# Patient Record
Sex: MALE | Race: OTHER | Employment: FULL TIME | URBAN - METROPOLITAN AREA
[De-identification: names, ages, dates, MRNs, and addresses within clinical notes are randomized per-mention and may not be internally consistent; named-entity substitution may affect disease eponyms.]

---

## 2020-03-10 ENCOUNTER — HOSPITAL ENCOUNTER (OUTPATIENT)
Dept: LAB | Age: 25
Discharge: HOME | End: 2020-03-10

## 2020-03-10 ENCOUNTER — OFFICE VISIT (OUTPATIENT)
Dept: FAMILY MEDICINE CLINIC | Age: 25
End: 2020-03-10

## 2020-03-10 VITALS
HEIGHT: 62 IN | WEIGHT: 151 LBS | BODY MASS INDEX: 27.79 KG/M2 | TEMPERATURE: 98.6 F | DIASTOLIC BLOOD PRESSURE: 81 MMHG | SYSTOLIC BLOOD PRESSURE: 136 MMHG | HEART RATE: 62 BPM

## 2020-03-10 DIAGNOSIS — R10.30 LOWER ABDOMINAL PAIN: ICD-10-CM

## 2020-03-10 DIAGNOSIS — Z13.9 ENCOUNTER FOR SCREENING: ICD-10-CM

## 2020-03-10 DIAGNOSIS — R10.30 LOWER ABDOMINAL PAIN: Primary | ICD-10-CM

## 2020-03-10 LAB
APPEARANCE UR: CLEAR
BACTERIA URNS QL MICRO: NEGATIVE /HPF
BILIRUB UR QL STRIP: NEGATIVE
BILIRUB UR QL: NEGATIVE
COLOR UR: NORMAL
EPITH CASTS URNS QL MICRO: NORMAL /LPF
GLUCOSE UR STRIP.AUTO-MCNC: NEGATIVE MG/DL
GLUCOSE UR-MCNC: NEGATIVE MG/DL
HGB UR QL STRIP: NEGATIVE
KETONES P FAST UR STRIP-MCNC: NEGATIVE MG/DL
KETONES UR QL STRIP.AUTO: NEGATIVE MG/DL
LEUKOCYTE ESTERASE UR QL STRIP.AUTO: NEGATIVE
NITRITE UR QL STRIP.AUTO: NEGATIVE
PH UR STRIP: 6.5 [PH] (ref 4.6–8)
PH UR STRIP: 6.5 [PH] (ref 5–8)
PROT UR QL STRIP: NEGATIVE
PROT UR STRIP-MCNC: NEGATIVE MG/DL
RBC #/AREA URNS HPF: NORMAL /HPF (ref 0–5)
SP GR UR REFRACTOMETRY: <1.005 (ref 1–1.03)
SP GR UR STRIP: 1 (ref 1–1.03)
UA UROBILINOGEN AMB POC: NORMAL (ref 0.2–1)
URINALYSIS CLARITY POC: CLEAR
URINALYSIS COLOR POC: YELLOW
URINE BLOOD POC: NORMAL
URINE LEUKOCYTES POC: NEGATIVE
URINE NITRITES POC: NEGATIVE
UROBILINOGEN UR QL STRIP.AUTO: 0.2 EU/DL (ref 0.2–1)
WBC URNS QL MICRO: NORMAL /HPF (ref 0–4)

## 2020-03-10 PROCEDURE — 81001 URINALYSIS AUTO W/SCOPE: CPT

## 2020-03-10 RX ORDER — IBUPROFEN 800 MG/1
800 TABLET ORAL
Qty: 60 TAB | Refills: 0 | Status: SHIPPED | OUTPATIENT
Start: 2020-03-10 | End: 2022-03-21

## 2020-03-10 NOTE — PROGRESS NOTES
Assessment/Plan:   Diagnoses and all orders for this visit:    1. Lower abdominal pain  -     URINALYSIS W/MICROSCOPIC; Future  -     ibuprofen (MOTRIN) 800 mg tablet; Take 1 Tab by mouth every eight (8) hours as needed for Pain. Belarusian sig    2. Encounter for screening  -     AMB POC URINALYSIS DIP STICK MANUAL W/O MICRO          [facility]  Subjective:   Claudine Triplett is a 26 y.o. male. Chief Complaint   Patient presents with    Urinary Pain     x2 month. No itching. \"Its a deep pain\"     History of Present Illness: deep urinary pain x 2 months. Constant? 20 days ago the pain started strong. No history of this. Pain of the urethra? No  Has soft stool, no constipation. 8/10 pain right now. The pain is worse with lying down. Last week he had back pain. He no longer has back pain. Pain in the abdomen? yes   Review of Systems: Negative for: fever, chest pain, shortness of breath, leg swelling. Social History: He  reports that he has never smoked. He has never used smokeless tobacco. He reports that he does not drink alcohol or use drugs. Current Medications:   Current Outpatient Medications   Medication Sig    ibuprofen (MOTRIN) 800 mg tablet Take 1 Tab by mouth every eight (8) hours as needed for Pain. [place]ian sig     No current facility-administered medications for this visit.         Objective:     Visit Vitals  /81 (BP 1 Location: Right arm)   Pulse 62   Temp 98.6 °F (37 °C) (Temporal)   Ht 5' 1.93\" (1.573 m)   Wt 151 lb (68.5 kg)   BMI 27.68 kg/m²      Wt Readings from Last 2 Encounters:   03/10/20 151 lb (68.5 kg)      Lab Review:  Results for orders placed or performed in visit on 03/10/20   AMB POC URINALYSIS DIP STICK MANUAL W/O MICRO   Result Value Ref Range    Color (UA POC) Yellow     Clarity (UA POC) Clear     Glucose (UA POC) Negative Negative    Bilirubin (UA POC) Negative Negative    Ketones (UA POC) Negative Negative    Specific gravity (UA POC) 1.005 1.001 - 1.035    Blood (UA POC) Trace Negative    pH (UA POC) 6.5 4.6 - 8.0    Protein (UA POC) Negative Negative    Urobilinogen (UA POC) normal 0.2 - 1    Nitrites (UA POC) Negative Negative    Leukocyte esterase (UA POC) Negative Negative      Physical Examination:   General appearance - well developed, no acute distress. Chest - clear to auscultation. Heart - regular rate and rhythm without murmurs, rubs, or gallops. Abdomen - bowel sounds present x 4, mild tenderness lower abdomen; ND.   - no testicular masses, no hernias, uncircumsized penis, no lesions or discharge. No testicular torsion. Extremities - distal sensation intact, no CCE. Assessment/Plan:   Diagnoses and all orders for this visit:    1. Lower abdominal pain  -     URINALYSIS W/MICROSCOPIC; Future  -     ibuprofen (MOTRIN) 800 mg tablet; Take 1 Tab by mouth every eight (8) hours as needed for Pain. [place] sig    2. Encounter for screening  -     AMB POC URINALYSIS DIP STICK MANUAL W/O MICRO      Elver Merlos, MSN, RN, FNP-BC, BC-ADM  Carmina Samayoa expressed understanding of this plan.

## 2020-03-10 NOTE — PROGRESS NOTES
Results for orders placed or performed in visit on 03/10/20   AMB POC URINALYSIS DIP STICK MANUAL W/O MICRO   Result Value Ref Range    Color (UA POC) Yellow     Clarity (UA POC) Clear     Glucose (UA POC) Negative Negative    Bilirubin (UA POC) Negative Negative    Ketones (UA POC) Negative Negative    Specific gravity (UA POC) 1.005 1.001 - 1.035    Blood (UA POC) Trace Negative    pH (UA POC) 6.5 4.6 - 8.0    Protein (UA POC) Negative Negative    Urobilinogen (UA POC) normal 0.2 - 1    Nitrites (UA POC) Negative Negative    Leukocyte esterase (UA POC) Negative Negative

## 2020-03-10 NOTE — PATIENT INSTRUCTIONS
Aprenda sobre la dieta para prevenir los cálculos renales - [ Learning About Diet for Kidney Stone Prevention ]  ¿Qué son los cálculos renales? Los cálculos renales están compuestos por sales y [employer] en la orina que gabino pequeñas \"piedritas\". Los cálculos pueden formarse en los riñones y en los uréteres (los conductos que van de los riñones a la vejiga). También pueden formarse en la vejiga. Los cálculos podrían no ser un problema en tanto se queden en los riñones. Kasia pueden provocar un dolor repentino e intenso. Es muy probable que haya dolor si los cálculos se desplazan de los riñones a la vejiga. Los cálculos renales pueden provocar teddy en la orina. Los cálculos renales suelen ser hereditarios. Usted tiene más probabilidades de tenerlos si no atif suficientes líquidos, en especial agua. Determinados alimentos y bebidas y algunos suplementos dietéticos también pueden elevar villaseñor riesgo de tener cálculos renales si los consume en exceso. ¿Qué puede hacer para prevenir los cálculos renales? Cambiar lo que come podría no prevenir todos los tipos de cálculos renales. Kasia para las personas que tienen antecedentes de ciertos tipos de cálculos renales, algunos cambios en la dieta podrían ayudar. Un dietista puede ayudarle a elaborar un plan de comidas que incluya opciones saludables y con bajo contenido de oxalatos. Aquí hay algunas pautas generales para comenzar. Planifique manuela comidas y refrigerios centrándose en alimentos con bajo contenido de oxalatos. Estos alimentos incluyen:  · Maíz, col Larisa Fend y calabaza. · Carne de res, alex, cerdo, pavo y pescado. · carmen Vano y yogur. Puede comer determinados alimentos que samantha medianamente ricos en oxalatos, kasia solo de vez en cuando. Estos alimentos incluyen:  · Pan. · Arroz integral.  · Panecillos ingleses (\"English muffins\"). · Higos. · Palomitas de maíz. · Habichuelas (judías verdes). · Tomates.   Limite los alimentos con muy alto contenido en oxalatos, entre ellos:  · Té morgan. · Café. · Chocolate. · Verduras de hojas shelia oscuro. · [place] (vince secos). Estas son otras cosas que puede hacer para ayudar prevenir los cálculos renales:  · Randi mucho líquido. Si tiene keith enfermedad renal, cardíaca o hepática y tiene que restringir los líquidos, hable con villaseñor médico antes de aumentar la cantidad de líquido que atif. · No tome más de la dosis diaria recomendada de vitaminas C y D.  · Limite la sal en villaseñor dieta. · Consuma keith dieta equilibrada que no sea demasiado malia en proteína. La atención de seguimiento es keith parte clave de villaseñor tratamiento y seguridad. Asegúrese de hacer y acudir a todas las citas, y llame a villaseñor médico si está teniendo problemas. También es keith buena idea saber los resultados de manuela exámenes y mantener keith lista de los medicamentos que jd. ¿Dónde puede encontrar más información en inglés? Christine Ashford a http://[email or web address].info/. Escriba C138 en la búsqueda para aprender más acerca de \"Aprenda sobre la dieta para prevenir los cálculos renales - [ Learning About Diet for Kidney Stone Prevention ]. \"  Revisado: 7 noviembre, 2018  Versión del contenido: 12.2  © 5720-0219 Healthwise, Incorporated. Las instrucciones de cuidado fueron adaptadas bajo licencia por [employer] (which disclaims liability or warranty for this information). Si usted tiene [place] afección médica o sobre estas instrucciones, siempre pregunte a villaseñor profesional de petrona. Healthwise, Incorporated niega toda garantía o responsabilidad por villaseñor uso de esta información. Cálculo renal: Instrucciones de cuidado - [ Kidney Stone: Care Instructions ]  Instrucciones de cuidado    Los cálculos renales se gabino cuando se aglutinan sales, minerales y otras sustancias que normalmente se encuentran en la orina.  Pueden ser mejia pequeños roseanna granos de arena o, raras veces, tan grandes roseanna pelotas de golf. Mientras el cálculo se desplaza por el uréter, que es el tubo que transporta la [place] del riñón a la vejiga, probablemente sienta dolor. El dolor puede ser leve o muy erika. También puede honey algo de teddy en la orina. En cuanto el cálculo llega a la vejiga, todo dolor intenso debería desaparecer. Si un cálculo es demasiado jayna para ser expulsado por villaseñor propia cuenta, quizás requiera un procedimiento médico para ayudarle a eliminar el cálculo. El médico lo davis revisado detenidamente, luc pueden desarrollarse problemas más tarde. Si nota algún problema o nuevos síntomas, busque tratamiento médico de inmediato. La atención de seguimiento es keith parte clave de villaseñor tratamiento y seguridad. Asegúrese de hacer y acudir a todas las citas, y llame a villaseñor médico si está teniendo problemas. También es keith buena idea saber los resultados de manuela exámenes y mantener keith lista de los medicamentos que jd. ¿Cómo puede cuidarse en el hogar? · Randi líquidos en abundancia, lo suficiente para que villaseñor orina sea de color amarillo maddy o transparente roseanna el agua. Si tiene enfermedad renal, cardíaca o hepática y tiene que restringir los líquidos, hable con villaseñor médico antes de aumentar la cantidad de líquidos que atif. · [employer] analgésicos (medicamentos para el dolor) exactamente según lo indicado. Llame a villaseñor médico si esther que está teniendo un problema con villaseñor medicamento. ? Si el médico le recetó un analgésico, tómelo según lo prescrito. ? Si no está tomando un analgésico recetado, pregúntele a villaseñor médico si puede desi jose de [employer]. Suad y siga todas las instrucciones de la [place]. · Quizás villaseñor médico le pida que cuele la orina para que pueda recoger el cálculo renal cuando lo elimine. Puede usar un colador de cocina o de té para atrapar el cálculo. Guárdelo en keith bolsa de plástico hasta que arleen a villaseñor médico de nuevo.   Cómo prevenir cálculos renales en el futuro  Algunos cambios en villaseñor dieta pueden ayudar a prevenir los cálculos renales. Dependiendo de la causa de los cálculos, puede que villaseñor médico le recomiende que:  · Randi líquidos en abundancia, lo suficiente para que villaseñor orina sea de color amarillo maddy o transparente roseanna el agua. Si tiene enfermedad renal, cardíaca o hepática y tiene que restringir los líquidos, hable con villaseñor médico antes de aumentar la cantidad de líquidos que atif. · Restrinja el café, el té y el alcohol. Además, evite el jugo de toronja. · No tome más de la dosis diaria recomendada de vitaminas C y D.  · Evite los antiácidos roseanna Gaviscon, Maalox, Mylanta o Tums. · Restrinja la cantidad de sal (sodio) en villaseñor dieta. · Consuma keith dieta equilibrada que no sea demasiado malia en proteína. · Restrinja alimentos que samantha ricos en keith sustancia llamada oxalato, que puede causar cálculos renales. Estos alimentos [employer] verduras de color shelia oscuro, el ruibarbo, el chocolate, el salvado de thien, las nueces, los arándanos y los frijoles. ¿Cuándo debe pedir ayuda? Llame a villaseñor médico ahora mismo o busque atención médica inmediata si:    · No puede retener líquidos.     · Villaseñor dolor empeora.     · Tiene fiebre o escalofríos.     · Tiene dolor de espalda nuevo o peor, nohelia debajo de la caja torácica (el costado).     · Tiene nueva o más teddy en la orina.    Vigile de cerca los cambios en villaseñor petrona y asegúrese de comunicarse con villaseñor médico si:    · No mejora roseanna se esperaba. ¿Dónde puede encontrar más información en inglés? Adria rodriguez http://luciana.info/. Kenney Alfonso E928 en la búsqueda para aprender más acerca de \"Cálculo renal: Instrucciones de cuidado - [ Kidney Stone: Care Instructions ]. \"  Revisado: 31 octubre, 2018  Versión del contenido: 12.2  © 8956-9097 [employer]. Las instrucciones de cuidado fueron adaptadas bajo licencia por [employer] (which disclaims liability or warranty for this information).  Si usted tiene [place] afección médica o sobre estas instrucciones, siempre pregunte a villaseñor profesional de petrona. [facility] niega toda garantía o responsabilidad por villaseñor uso de esta información.

## 2020-03-10 NOTE — PROGRESS NOTES
Avs discussed with Dmitriy Haskins by Discharge Nurse Tabby Garcia LPN. Dicussed medication prescribed today. Pt also advised when he need to go the emergency room. Patient verbalized understanding and has no further questions.  AVS printed and given to patient Tabby Garcia LPN

## 2020-07-03 ENCOUNTER — APPOINTMENT (OUTPATIENT)
Dept: CT IMAGING | Age: 25
End: 2020-07-03
Attending: PHYSICIAN ASSISTANT
Payer: SUBSIDIZED

## 2020-07-03 ENCOUNTER — HOSPITAL ENCOUNTER (EMERGENCY)
Age: 25
Discharge: HOME | End: 2020-07-03
Attending: EMERGENCY MEDICINE | Admitting: EMERGENCY MEDICINE
Payer: SUBSIDIZED

## 2020-07-03 VITALS
RESPIRATION RATE: 18 BRPM | OXYGEN SATURATION: 99 % | TEMPERATURE: 99.2 F | SYSTOLIC BLOOD PRESSURE: 123 MMHG | HEART RATE: 71 BPM | DIASTOLIC BLOOD PRESSURE: 82 MMHG

## 2020-07-03 DIAGNOSIS — G43.801 OTHER MIGRAINE WITH STATUS MIGRAINOSUS, NOT INTRACTABLE: Primary | ICD-10-CM

## 2020-07-03 LAB
ALBUMIN SERPL-MCNC: 4.2 G/DL (ref 3.5–5)
ALBUMIN/GLOB SERPL: 1.1 {RATIO} (ref 1.1–2.2)
ALP SERPL-CCNC: 67 U/L (ref 45–117)
ALT SERPL-CCNC: 44 U/L (ref 12–78)
ANION GAP SERPL CALC-SCNC: 6 MMOL/L (ref 5–15)
AST SERPL-CCNC: 21 U/L (ref 15–37)
BILIRUB SERPL-MCNC: 0.5 MG/DL (ref 0.2–1)
BUN SERPL-MCNC: 5 MG/DL (ref 6–20)
BUN/CREAT SERPL: 6 (ref 12–20)
CALCIUM SERPL-MCNC: 9.3 MG/DL (ref 8.5–10.1)
CHLORIDE SERPL-SCNC: 107 MMOL/L (ref 97–108)
CO2 SERPL-SCNC: 27 MMOL/L (ref 21–32)
CREAT SERPL-MCNC: 0.84 MG/DL (ref 0.7–1.3)
ERYTHROCYTE [DISTWIDTH] IN BLOOD BY AUTOMATED COUNT: 12.6 % (ref 11.5–14.5)
GLOBULIN SER CALC-MCNC: 3.8 G/DL (ref 2–4)
GLUCOSE SERPL-MCNC: 96 MG/DL (ref 65–100)
HCT VFR BLD AUTO: 48.7 % (ref 36.6–50.3)
HGB BLD-MCNC: 16.7 G/DL (ref 12.1–17)
MCH RBC QN AUTO: 30.5 PG (ref 26–34)
MCHC RBC AUTO-ENTMCNC: 34.3 G/DL (ref 30–36.5)
MCV RBC AUTO: 89 FL (ref 80–99)
NRBC # BLD: 0 K/UL (ref 0–0.01)
NRBC BLD-RTO: 0 PER 100 WBC
PLATELET # BLD AUTO: 270 K/UL (ref 150–400)
PMV BLD AUTO: 10.1 FL (ref 8.9–12.9)
POTASSIUM SERPL-SCNC: 3.7 MMOL/L (ref 3.5–5.1)
PROT SERPL-MCNC: 8 G/DL (ref 6.4–8.2)
RBC # BLD AUTO: 5.47 M/UL (ref 4.1–5.7)
SODIUM SERPL-SCNC: 140 MMOL/L (ref 136–145)
WBC # BLD AUTO: 5.8 K/UL (ref 4.1–11.1)

## 2020-07-03 PROCEDURE — 99283 EMERGENCY DEPT VISIT LOW MDM: CPT

## 2020-07-03 PROCEDURE — 85027 COMPLETE CBC AUTOMATED: CPT

## 2020-07-03 PROCEDURE — 80053 COMPREHEN METABOLIC PANEL: CPT

## 2020-07-03 PROCEDURE — 36415 COLL VENOUS BLD VENIPUNCTURE: CPT

## 2020-07-03 PROCEDURE — 74011250637 HC RX REV CODE- 250/637: Performed by: PHYSICIAN ASSISTANT

## 2020-07-03 PROCEDURE — 74011250636 HC RX REV CODE- 250/636: Performed by: PHYSICIAN ASSISTANT

## 2020-07-03 PROCEDURE — 70450 CT HEAD/BRAIN W/O DYE: CPT

## 2020-07-03 PROCEDURE — 96375 TX/PRO/DX INJ NEW DRUG ADDON: CPT

## 2020-07-03 PROCEDURE — 96374 THER/PROPH/DIAG INJ IV PUSH: CPT

## 2020-07-03 RX ORDER — ONDANSETRON 4 MG/1
4 TABLET, FILM COATED ORAL
Qty: 15 TAB | Refills: 0 | Status: SHIPPED | OUTPATIENT
Start: 2020-07-03 | End: 2022-03-21

## 2020-07-03 RX ORDER — KETOROLAC TROMETHAMINE 30 MG/ML
15 INJECTION, SOLUTION INTRAMUSCULAR; INTRAVENOUS
Status: COMPLETED | OUTPATIENT
Start: 2020-07-03 | End: 2020-07-03

## 2020-07-03 RX ORDER — ONDANSETRON 4 MG/1
4 TABLET, ORALLY DISINTEGRATING ORAL
Status: COMPLETED | OUTPATIENT
Start: 2020-07-03 | End: 2020-07-03

## 2020-07-03 RX ORDER — DEXAMETHASONE SODIUM PHOSPHATE 10 MG/ML
10 INJECTION INTRAMUSCULAR; INTRAVENOUS
Status: COMPLETED | OUTPATIENT
Start: 2020-07-03 | End: 2020-07-03

## 2020-07-03 RX ADMIN — DEXAMETHASONE SODIUM PHOSPHATE 10 MG: 10 INJECTION, SOLUTION INTRAMUSCULAR; INTRAVENOUS at 12:43

## 2020-07-03 RX ADMIN — KETOROLAC TROMETHAMINE 15 MG: 30 INJECTION, SOLUTION INTRAMUSCULAR at 12:43

## 2020-07-03 RX ADMIN — ONDANSETRON 4 MG: 4 TABLET, ORALLY DISINTEGRATING ORAL at 12:43

## 2020-07-03 NOTE — ED TRIAGE NOTES
Re-triage using [employer] phone (#[id number]): \"I'm here for my head. I feel pressure in a vein in the back of my head. Its affecting my every day life and I am worried because I am forgetting things. I have blurred vision when my head is hurting. My left ear also hurts when my head hurts\"  This problem has occurred x 2 years but worsened in last 5 months. Pt also reports left sided groin pain that has recently subsided X 9 months.

## 2020-07-03 NOTE — ED PROVIDER NOTES
HPI     Patient presents the emergency department today with complaint of persistent headaches on and off for the last 2 years. He states that over the last 5 months his headaches is worsened significantly. Over the last several days they have remained constant, intractable and not improving with ibuprofen and Tylenol. He denies fever and chills. He does explain that sometimes he has occasional vision but has not experienced this in quite some time. Patient does experience nausea associated with his migraine headaches. He does not experience any numbness or tingling. Is occasionally experience dizziness associated with this as well. Argentine to Homero Primer  was used with [employer] phone to interpret patient's history and physical.    No past medical history on file. No past surgical history on file. No family history on file.     Social History     Socioeconomic History    Marital status: SINGLE     Spouse name: Not on file    Number of children: Not on file    Years of education: Not on file    Highest education level: Not on file   Occupational History    Not on file   Social Needs    Financial resource strain: Not on file    Food insecurity     Worry: Not on file     Inability: Not on file    Transportation needs     Medical: Not on file     Non-medical: Not on file   Tobacco Use    Smoking status: Never Smoker    Smokeless tobacco: Never Used   Substance and Sexual Activity    Alcohol use: Never     Frequency: Never    Drug use: Never    Sexual activity: Not on file   Lifestyle    Physical activity     Days per week: Not on file     Minutes per session: Not on file    Stress: Not on file   Relationships    Social connections     Talks on phone: Not on file     Gets together: Not on file     Attends Rastafarian service: Not on file     Active member of club or organization: Not on file     Attends meetings of clubs or organizations: Not on file     Relationship status: Not on file  Intimate partner violence     Fear of current or ex partner: Not on file     Emotionally abused: Not on file     Physically abused: Not on file     Forced sexual activity: Not on file   Other Topics Concern    Not on file   Social History Narrative    Not on file         ALLERGIES: Pork derived (porcine)    Review of Systems   Constitutional: Negative for activity change, appetite change, fatigue and fever. HENT: Negative for ear pain, rhinorrhea, sore throat and trouble swallowing. Eyes: Negative for photophobia and visual disturbance. Respiratory: Negative for cough, chest tightness and shortness of breath. Cardiovascular: Negative for chest pain, palpitations and leg swelling. Gastrointestinal: Positive for nausea. Negative for abdominal pain, diarrhea and vomiting. Endocrine: Negative for polyuria. Genitourinary: Negative for dysuria, frequency and urgency. Musculoskeletal: Negative for back pain and neck pain. Skin: Negative for color change. Neurological: Positive for light-headedness and headaches. Negative for dizziness, weakness and numbness. Hematological: Negative for adenopathy. Does not bruise/bleed easily. Vitals:    07/03/20 1024 07/03/20 1104   BP: 123/82    Pulse: 71    Resp: 18    Temp: 99.2 °F (37.3 °C)    SpO2: 99% 99%            Physical Exam  Vitals signs and nursing note reviewed. Constitutional:       General: He is not in acute distress. Appearance: Normal appearance. He is normal weight. He is not ill-appearing, toxic-appearing or diaphoretic. HENT:      Head: Normocephalic and atraumatic. Right Ear: External ear normal.      Left Ear: External ear normal.      Nose: No rhinorrhea. Mouth/Throat:      Pharynx: No oropharyngeal exudate or posterior oropharyngeal erythema. Eyes:      Extraocular Movements: Extraocular movements intact. Pupils: Pupils are equal, round, and reactive to light.    Neck:      Musculoskeletal: Normal range of motion and neck supple. No neck rigidity or muscular tenderness. Cardiovascular:      Rate and Rhythm: Normal rate and regular rhythm. Pulses: Normal pulses. Pulmonary:      Effort: Pulmonary effort is normal.   Musculoskeletal: Normal range of motion. General: No swelling, tenderness, deformity or signs of injury. Skin:     General: Skin is warm. Capillary Refill: Capillary refill takes 2 to 3 seconds. Neurological:      General: No focal deficit present. Mental Status: He is alert and oriented to person, place, and time. Psychiatric:         Mood and Affect: Mood normal.         Behavior: Behavior normal.          MDM       Tumor, intracranial hemorrhage, acutemigraine headache, and long cluster headache    Patient presents the emergency department today with symptoms of migraine headache. This may also possibly be medication overuse headache, as the patient has been taking ibuprofen for quite some time. He has not sought treatment for this with a primary care provider or neurologist.  He is driving today and cannot receive full migraine cocktail however has been provided some of the medications which should help abort his headache. I am reluctant to prescribe Fioricet or sumatriptan hand given that patient has not worked with her primary care provider at this point. He is receiving prescription for Zofran to address the nausea associated with his headaches. He will be referred to neurology as well as PCP. CT was negative for acute findings. Patient's cranial nerve exam is without deficit. He walks and talks normally. Funduscopy was not performed.   He has no signs or symptoms concerning for focal neuro deficit or neurological emergency    Mauricio Goddard PA-C  12:39 PM        Procedures

## 2020-07-03 NOTE — ED TRIAGE NOTES
Arrives ambulatory for headache and neck pain that worsens at night x 2 YEARS but states progressed x 3 months. Taking OTC medication without relief.

## 2020-07-06 ENCOUNTER — PATIENT OUTREACH (OUTPATIENT)
Dept: FAMILY MEDICINE CLINIC | Age: 25
End: 2020-07-06

## 2020-07-06 NOTE — PROGRESS NOTES
ED Discharge Follow-Up    Date/Time:     2020 7:06 PM    Patient presented to [facility]  ED on 7/3/20 and was diagnosed withOther migraine with status migrainosus, not intractable        Top Challenges reviewed with the provider   HAs x 2yrs, worsened for the past 5months. Head CT, CBC and CMP nl    May need [facility] for Neurology           Method of communication with provider :chart routing    Nurse Navigator(NN) contacted the  patient  by telephone to perform post ED discharge assessment. Verified name and  with patient as identifiers. Provided introduction to self, and explanation of the Nurse Navigator role. Patient reported assessment: reports head pain 7/10 today , describes as \" like a band around my head that is tight\". It is in the back of my head near my neck and also my forehead above my nose. H/O sinusitis, \"but that is all better now\". Has not tried decongestant recently but denies relief from Motrin 800mg. C/O dizziness when he bends over and also is worried that he is forgetting things. He knows his tests at the ED were ok and that ED provider advised appt with doctor \" to thoroughly check all my nerves\"   He is worried about no insurance and \"I don't have much money\"  NN briefly explained [facility] if a specialist is needed but will talk to pcp about this at appt tomorrow. Denies fever, sob, cough or any exposure to a known Covid 19 contact. Medication(s):   New Medications at Discharge: Zofran for nausea that sometimes accompanies the bad headaches    There were no barriers to obtaining medications identified at this time. Reviewed discharge instructions and red flags with  patient who voiced understanding. Patient given an opportunity to ask questions and does not have any further questions or concerns at this time. The patient agrees to contact the PCP office for questions related to their healthcare.        Offered follow up appointment with PCP: yes [facility] follow up appointment(s):   Future Appointments   Date Time Provider Robyn Hewitt   7/7/2020  2:30 PM Cyn Villavicencio NP [address] pt that this is a vitrual appt and a CVAN psr will call prior to appt time to get him registered.

## 2020-07-07 ENCOUNTER — OFFICE VISIT (OUTPATIENT)
Dept: FAMILY MEDICINE CLINIC | Age: 25
End: 2020-07-07

## 2020-07-07 ENCOUNTER — TELEPHONE (OUTPATIENT)
Dept: FAMILY MEDICINE CLINIC | Age: 25
End: 2020-07-07

## 2020-07-07 DIAGNOSIS — G43.919 INTRACTABLE MIGRAINE WITHOUT STATUS MIGRAINOSUS, UNSPECIFIED MIGRAINE TYPE: Primary | ICD-10-CM

## 2020-07-07 RX ORDER — ACETAMINOPHEN 500 MG
TABLET ORAL
COMMUNITY
End: 2020-08-28

## 2020-07-07 NOTE — PROGRESS NOTES
Coordination of Care  1. Have you been to the ER, urgent care clinic since your last visit? Hospitalized since your last visit? Pt with to ED last week for headache. 2. Have you seen or consulted any other health care providers outside of the [address] [place] [address] since your last visit? Include any pap smears or colon screening. 2 months ago he went to [facility] for same cheif complaint    Does the patient need refills? NO    Learning Assessment Complete? yes  Depression Screening complete in the past 12 months? yes     For intake I used [place] as .  Lina Best RN

## 2020-07-07 NOTE — TELEPHONE ENCOUNTER
Phone call received this afternoon from provider's discharge nurse stating that the provider's check-out notes indicates that the patient has questions about his referral to [facility] and would like a call today after 3:30pm if possible. I agreed to call the patient. T/C made to the patient with assistance from [employer]  #[id number]. The patient did not have any specific questions about the [facility] program or his referral to Neurology. We reviewed that the next step is to complete a financial screening and that he will receive a call soon from a CAV registrar to schedule an appointment for him with one of our outreach workers. The patient expressed understanding and has my office number, [phone], if needed.  Loyda Parker RN

## 2020-07-07 NOTE — PROGRESS NOTES
Angela Rajput is a 26 y.o. male evaluated via telephone at [phone] on 7/7/2020. Patient identification verified with 2 identifiers. Consent: He and/or health care decision maker has provided verbal consent to proceed: Yes Pursuant to the emergency declaration under the [facility], [facility] and the [employer] and [employer] Act, this Virtual Telephone Visit was conducted to reduce the patient's risk of exposure to COVID-19. : Patsy Shipley  Chief Complaint   Patient presents with   [employer] ED Follow-up     headache     HPI ER follow up. Went to ER on 7/3/2020 due to worsening headaches over the last 3 months, severe, associated with blurry vision and neck pain. CT scan negative, had normal cranial nerve exam.  Had normal cbc and cmp. Current pain level 8/10. Plan referral to neurology AN. Documentation:  I communicated with the patient and/or health care decision maker about:  Diagnoses and all orders for this visit:    1. Intractable migraine without status migrainosus, unspecified migraine type  -     REFERRAL TO NEUROLOGY        Details of this discussion including any medical advice provided: reassured normal exams from ER, acknowledged severity of pain of migraines and importance of follow up with neurology. Total Time: minutes: 11-20 minutes  I affirm this is a Patient Initiated Episode with a Patient who has not had a related appointment within my department in the past 7 days or scheduled within the next 24 hours. NICHOLAS Benites expressed understanding and agreed to this plan.

## 2020-07-09 ENCOUNTER — OFFICE VISIT (OUTPATIENT)
Dept: FAMILY MEDICINE CLINIC | Age: 25
End: 2020-07-09

## 2020-07-09 DIAGNOSIS — Z71.89 COUNSELING AND COORDINATION OF CARE: Primary | ICD-10-CM

## 2020-07-09 NOTE — PROGRESS NOTES
(Telephone encounter due to closed clinics because of COVID19) OW called pt and assisted with [employer] application. OW explained the process to the pt and he verbalized understanding. OW mailed the forms that need to be signed to pt, along with written instructions in Kos[place] on what to do and where to send forms back to OW. Pending POI.

## 2020-07-22 ENCOUNTER — DOCUMENTATION ONLY (OUTPATIENT)
Dept: FAMILY MEDICINE CLINIC | Age: 25
End: 2020-07-22

## 2020-07-22 NOTE — PROGRESS NOTES
OW received pending documents from pt via mail. Pt sent the signed forms bur he did not send his POI.

## 2020-07-23 ENCOUNTER — OFFICE VISIT (OUTPATIENT)
Dept: FAMILY MEDICINE CLINIC | Age: 25
End: 2020-07-23

## 2020-07-23 DIAGNOSIS — Z71.89 COUNSELING AND COORDINATION OF CARE: Primary | ICD-10-CM

## 2020-07-23 NOTE — PROGRESS NOTES
OW received signed forms from pt via mail, but no POI. OW called pt to let him know and was unable to speak with pt. Left a vm asking pt to call OW back. Pt called OW back at 11:25 am. OW explained that the signed forms were received but pt did not send POI. Pt said he would mail his most recent pay stubs to OW asa. OW confirmed the mailing address. Pt verbalized understanding.

## 2020-07-28 ENCOUNTER — OFFICE VISIT (OUTPATIENT)
Dept: FAMILY MEDICINE CLINIC | Age: 25
End: 2020-07-28

## 2020-07-28 DIAGNOSIS — Z71.89 COUNSELING AND COORDINATION OF CARE: Primary | ICD-10-CM

## 2020-07-28 NOTE — PROGRESS NOTES
VV. OW received pending documents from patient via regular mail. Application was completed and sent to nurse Juan Leger via [employer]. OW called pt to let him know the day he needs to call [employer] [facility] to schedule his appt and was not able to speak with pt. OW left a voice message asking to call OW back. OW also sent a text message to pt asking to call OW back.

## 2020-08-28 ENCOUNTER — OFFICE VISIT (OUTPATIENT)
Dept: NEUROLOGY | Age: 25
End: 2020-08-28
Payer: SUBSIDIZED

## 2020-08-28 VITALS
HEART RATE: 83 BPM | SYSTOLIC BLOOD PRESSURE: 98 MMHG | OXYGEN SATURATION: 98 % | BODY MASS INDEX: 27.79 KG/M2 | DIASTOLIC BLOOD PRESSURE: 62 MMHG | HEIGHT: 62 IN | TEMPERATURE: 98.5 F | RESPIRATION RATE: 18 BRPM | WEIGHT: 151 LBS

## 2020-08-28 DIAGNOSIS — R51.9 CHRONIC DAILY HEADACHE: Primary | ICD-10-CM

## 2020-08-28 PROCEDURE — 99205 OFFICE O/P NEW HI 60 MIN: CPT | Performed by: PSYCHIATRY & NEUROLOGY

## 2020-08-28 RX ORDER — BUTALBITAL, ACETAMINOPHEN AND CAFFEINE 50; 325; 40 MG/1; MG/1; MG/1
1 TABLET ORAL
Qty: 15 TAB | Refills: 1 | Status: SHIPPED | OUTPATIENT
Start: 2020-08-28 | End: 2020-12-08 | Stop reason: SDUPTHER

## 2020-08-28 RX ORDER — AMITRIPTYLINE HYDROCHLORIDE 25 MG/1
25 TABLET, FILM COATED ORAL
Qty: 30 TAB | Refills: 1 | Status: SHIPPED | OUTPATIENT
Start: 2020-08-28 | End: 2020-12-08 | Stop reason: SDUPTHER

## 2020-08-28 NOTE — PATIENT INSTRUCTIONS
Dolor de daksha: Instrucciones de cuidado  Headache: Care Instructions  Instrucciones de cuidado    Los nina de [place] tienen muchas causas posibles. La mayoría de los nina de daksha no son señal de un problema más marjorie y mejoran por sí solos. El tratamiento en el hogar podría ayudarlo a sentirse mejor con Rejeana Loud. El médico lo davis revisado minuciosamente, luc puede desarrollar problemas más tarde. Si nota algún problema o síntomas, busque tratamiento médico inmediatamente. La atención de seguimiento es keith parte clave de villaseñor tratamiento y seguridad. Asegúrese de hacer y acudir a todas las citas, y llame a villaseñor médico si está teniendo problemas. También es keith buena idea saber los resultados de manuela exámenes y mantener keith lista de los medicamentos que jd. ¿Cómo puede cuidarse en el [facility]? · No conduzca si ha tomado analgésicos (medicamentos para el dolor) recetados. · Descanse en un cuarto tranquilo y oscuro hasta que desaparezca el dolor de [place]. Cierre los ojos y trate de relajarse o dormirse. No arleen la televisión ni nani. · Colóquese un paño frío y húmedo o Marnette Moots compresa fría sobre la brea adolorida de 10 a 21 minutos cada vez. Póngase un paño cotter entre la compresa fría y la piel. · Utilice keith toalla húmeda tibia o keith almohadilla térmica ajustada a baja temperatura para relajar los músculos tensos del rose y los hombros.  · Pídale a alguien que le neeraj masajes suaves en el rose y los hombros.  · Crane los analgésicos exactamente roseanna le fueron indicados. ? Si el médico le recetó un analgésico, tómelo según las indicaciones. ? Si no está tomando un analgésico recetado, pregúntele a villaseñor médico si puede desi jose de [employer]. · Tenga cuidado de no desi analgésicos con mayor frecuencia que la permitida en las indicaciones porque los nina de daksha podrían empeorar o aparecer con mayor frecuencia keith vez que el medicamento pierda villaseñor [place].   · Preste atención a los nuevos síntomas que aparecen con el dolor de [place], [place], debilidad o entumecimiento, cambios en la visión o confusión. Podrían ser señales de un problema más grave. Para prevenir los nina de daksha  · [employer] un diario de manuela nina de daksha para que pueda averiguar qué los desencadena. Evitar los desencadenantes podría ayudar a prevenir los nina de [place]. Anote cuándo empieza cada dolor de [place], cuánto dura y cómo es el dolor (palpitante, stephon, punzante o sordo). Anote cualquier otro síntoma que haya tenido con el dolor de [place], [place] náuseas, destellos de edel o LAURA, o sensibilidad a la edel brillante o a los ruidos logan. Anote si el dolor de daksha ocurrió cerca de villaseñor menstruación. Enumere todos los factores que pudieran honey desencadenado el dolor de [place], roseanna ciertos alimentos (chocolate, queso, vino) u olores, humo, luces brillantes, estrés o falta de sueño. · Encuentre maneras saludables de [facility]. Los nina de [place] son más comunes josiah o nohelia después de un momento estresante. Tómese un tiempo para relajarse antes y después de hacer algo que le haya causado un dolor de daksha en el pasado. · Trate de mantener manuela músculos relajados mediante keith buena postura. Revise si tiene [employer] de la Mariann, la toshia, el rose y los hombros y trate de relajarlos. Cuando se siente en un escritorio, cambie de posición con frecuencia y estírese por 27 segundos cada hora. · Jo suficiente ejercicio y duerma bastante. · Coma en forma regular y maury. Largos períodos sin comer pueden provocar un dolor de daksha. · Regálese un masaje. Algunas personas encuentran que los masajes hechos con regularidad son Jake Kinnier para aliviar la tensión. · Limite la cafeína. No joe demasiado café, té ni sodas. Kasia no deje de consumir cafeína de repente, porque eso también puede provocarle nina de [place].   · Reduzca la tensión en los ojos a causa de la computadora parpadeando con frecuencia y apartando la mirada de la pantalla a menudo. Asegúrese de tener lentes adecuados y de que villaseñor monitor esté colocado de manera correcta, roseanna a un brazo de distancia. · Busque ayuda si tiene depresión o ansiedad. [place] nina de [place] podrían relacionarse con estas afecciones. El tratamiento puede prevenir los nina de [place] y ayudar con los síntomas de ansiedad o depresión. ¿Cuándo debe pedir ayuda? Llame [id number] en cualquier momento que piense que puede necesitar atención de emergencia. Por ejemplo, llame si:  · Tiene señales de un ataque cerebral. Estas pueden incluir:  ? Parálisis, entumecimiento o debilidad repentinos en la toshia, el brazo o la pierna, sobre todo si ocurre en un solo lado del cuerpo. ? Cambios repentinos en la visión. ? Dificultades repentinas para hablar. ? Confusión repentina o dificultad para comprender frases sencillas. ? Problemas repentinos para caminar o mantener el equilibrio. ? Dolor de [place] intenso y repentino, distinto de los nina de [place] Elisabet. Llame a villaseñor médico ahora mismo o busque atención médica inmediata si:  · Tiene un dolor de daksha nuevo o peor. · Villaseñor dolor de daksha [facility]. ¿Dónde puede encontrar más información en inglés? Mikhail a [email or web address]  Grace Tillman [id number] en la búsqueda para aprender más acerca de \"Dolor de daksha: Instrucciones de cuidado. \"  Revisado: 20 noviembre, 3276               MLLUAUM del contenido: 12.5  © 0531-9021 [employer], Incorporated. Las instrucciones de cuidado fueron adaptadas bajo licencia por [facility] (which disclaims liability or warranty for this information). Si usted tiene [place] afección médica o sobre estas instrucciones, siempre pregunte a villaseñor profesional de petrona. [employer] niega toda garantía o responsabilidad por villaseñor uso de esta información.

## 2020-08-28 NOTE — PROGRESS NOTES
Referring Physician: Self Referred     Reason for Consultation:  Headaches     Chief Complaint: headaches    History of Present Illness:   Amanda Perez is a 26 y.o. male with no prior medical history presents to neurology clinic for evaluation of headaches. Patient reports that he started having migraine headaches approximately 2 years ago in April 2, 2018. He believes that at that time the headaches started they were not very severe and were mainly located in the front of the head. Symptoms had worsened over the course of the 2 years and he feels that they significantly worsened approximately a year ago. He also reports that he has significant memory issues when his pain is more severe and has difficulty with remembering short-term information. Headache characteristics:  Location: Bifrontal region now radiates to the top and the back of the head. Character: achy pressure   Radiation: back of headache   Intensity: On average 8-9/10  Frequency:  Everyday, worse in afternoon due to noise. States that there is no pain when he sleeps at night. Duration: all day   Triggers: noise,  No worsening reported with cough/sneeze, bending over  Alleviating factors: Nothing  Aura: none   Associated Sx with HA:  Denies nausea/vomiting, and photophobia. Patiently does have sensitivity to sound as he always denies unilateral ptosis, conjunctival injection, lacrimation, sweating  Prior treatments: ibuprofen, tylenol which he states he takes twice a week. Prior Imaging: hct normal   Caffeine use: None  H/O Head trauma: Prior history of trauma  Depressive or anxiety Sx: none   Stop BANG score (LACEY):   intepretator was used during this visit.      Medical hx  none    Surgical hx  None    Family hx:   No known hx      Social History     Tobacco Use    Smoking status: Never Smoker    Smokeless tobacco: Never Used   Substance Use Topics    Alcohol use: Never     Frequency: Never        Allergies   Allergen Reactions    Pork Derived (Porcine) Rash        Prior to Admission medications    Medication Sig Start Date End Date Taking? Authorizing Provider   acetaminophen (TYLENOL) 500 mg tablet Take  by mouth every six (6) hours as needed for Pain. Yes Provider, Historical   ondansetron hcl (ZOFRAN) 4 mg tablet Take 1 Tab by mouth every eight (8) hours as needed for Nausea or Vomiting. 7/3/20   Angelito Ravi PA-C   ibuprofen (MOTRIN) 800 mg tablet Take 1 Tab by mouth every eight (8) hours as needed for Pain. B[place]ian sig 3/10/20   Moriah Pap, NP       Review of Systems:  General, constitutional: negative  Eyes, vision: negative  Ears, nose, throat: negative  Cardiovascular, heart: negative  Respiratory: negative  Gastrointestinal: negative  Genitourinary: negative  Musculoskeletal: negative  Skin and integumentary: negative  Psychiatric: negative  Endocrine: negative  Neurological: negative, except for HPI  Hematologic/lymphatic: negative  Allergy/immunology: negative    Visit Vitals  BP 98/62 (BP 1 Location: Left arm, BP Patient Position: Sitting)   Pulse 83   Temp 98.5 °F (36.9 °C)   Resp 18   Ht 5' 1.93\" (1.573 m)   Wt 151 lb (68.5 kg)   SpO2 98%   BMI 27.68 kg/m²       Physical Exam:  General:  no acute distress  Neck: no carotid bruits  Lungs: clear to auscultation  Heart:  no murmurs, regular rate and rhythm   Lower extremity: no edema    Neurological exam:  Mental Status: Awake, alert, oriented to person, place and time  Registration and Recall: registration intact, able to recall 3/3 words correctly at 5 min   Attention and Concentration: able to state the days of the week backwards   Speech and Language: No dysarthria.  Able to name, repeat and follow commands   Fund of knowledge was preserved    Cranial nerves: II-XII  Pupils equal and reactive, visual fields intact by confrontation   Fundus: venous pulsations and sharp disc margins noted bilaterally  Extraocular movements intact, no evidence of nystagmus or ptosis Facial sensation intact   Facial movements symmetric   Hearing intact to soft rub bilaterally   Shoulder shrug symmetric and strong   Tongue protrusion full and midline without fasciculation or atrophy    Motor:   Normal tone and Bulk   Drift: No evidence of pronator drift     Strength testing:   deltoid triceps biceps Wrist ext. Wrist flex. intrinsics   Right 5 5 5 5 5 5   Left 5 5 5 5 5 5      Hip flex. Hip ext. Knee ext. Knee flex Dorsi flex Plantar flex   Right  5 5 5 5 5 5   Left  5 5 5 5 5 5       Sensory:  Intact to light touch and pinprick. Reflexes:     Biceps Triceps  Brachiorad Patellar Achilles Plantar Hoffmans   Right  2 2 2 2 2 Down Neg   Left  2 2 2 2 2 Down Neg        Cerebellar testing:  No dysmetria. ; finger-to-nose and heel-to- shin testing are within normal limits. Romberg: absent    Gait: steady. Data:   INTERNAL RECORDS:  The patient's electronic medical record was reviewed. The relevant details include:    Lab Results   Component Value Date/Time    Sodium 140 07/03/2020 10:51 AM    Potassium 3.7 07/03/2020 10:51 AM    Chloride 107 07/03/2020 10:51 AM    Glucose 96 07/03/2020 10:51 AM    BUN 5 (L) 07/03/2020 10:51 AM    Creatinine 0.84 07/03/2020 10:51 AM    Calcium 9.3 07/03/2020 10:51 AM    WBC 5.8 07/03/2020 10:51 AM    HCT 48.7 07/03/2020 10:51 AM    HGB 16.7 07/03/2020 10:51 AM    PLATELET 931 17/98/9800 10:51 AM       CT Results (maximum last 3): Results from [place] encounter on 07/03/20   CT HEAD WO CONT    Narrative EXAM: CT HEAD WO CONT    INDICATION: headache, blurred vision, tinnitus    COMPARISON: None. CONTRAST: None. TECHNIQUE: Unenhanced CT of the head was performed using 5 mm images. Brain and  bone windows were generated. Coronal and sagittal reformats. CT dose reduction  was achieved through use of a standardized protocol tailored for this  examination and automatic exposure control for dose modulation.       FINDINGS:  The ventricles and sulci are normal in size, shape and configuration. . There is  no significant white matter disease. There is no intracranial hemorrhage,  extra-axial collection, or mass effect. The basilar cisterns are open. No CT  evidence of acute infarct. The bone windows demonstrate no abnormalities. The visualized portions of the  paranasal sinuses and mastoid air cells are clear. Impression IMPRESSION:   No acute intracranial abnormality           MRI Results (maximum last 3): No results found for this or any previous visit. Assessment and Plan   Randee Bustamante is a 26 y.o. male with no prior medical history presents to neurology clinic for evaluation of headaches which appear to be tension type headaches as he does not have significant migrainous features. Does have some subjective memory complaints that occur when the pain is severe otherwise his neurological exam is unremarkable. Chronic daily headaches  -For abortive treatment I have recommended that patient continue with Fioricet. I recommended that he take this medication at the onset of severe headache. He should also try to limit taking this medication as it can cause rebound headaches. For preventative therapy I have given patient amitriptyline 25 mg at bedtime. This may help with some of the other symptoms he is experiencing such as lack of sleep and anxiety. We discussed the side effects of orthostatic hypotension dry eyes dry mouth.   - Given that he has never had imaging of his head I do recommend obtaining an MRI of his brain with and without contrast to ensure that there is no lesion mass or stroke resulting in his headaches.  -We discussed the importance of a proper diet including plenty of fruits and vegetables as this can help with headache prevention.  -We discussed the importance of staying hydrated and drinking at least 32 to 64 ounces of water daily as this can be a cause of tension type headaches.  -We also discussed the importance of keeping a headache journal or log to identify triggers.  -We discussed the importance of sleep hygiene and attempting to get at least 6 to 8 hours of sleep daily to help with headache prevention. I have discussed the diagnosis with the patient and the intended plan as seen in the above orders. Patient is in agreement. The patient has received an after-visit summary and questions were answered concerning future plans. I have discussed medication side effects and warnings with the patient as well.         Signed By:  Chanel Verdugo MD     August 28, 2020

## 2020-09-19 ENCOUNTER — HOSPITAL ENCOUNTER (OUTPATIENT)
Dept: MRI IMAGING | Age: 25
Discharge: HOME | End: 2020-09-19
Attending: PSYCHIATRY & NEUROLOGY
Payer: SUBSIDIZED

## 2020-09-19 DIAGNOSIS — R51.9 CHRONIC DAILY HEADACHE: ICD-10-CM

## 2020-09-19 PROCEDURE — 70553 MRI BRAIN STEM W/O & W/DYE: CPT

## 2020-09-19 PROCEDURE — A9575 INJ GADOTERATE MEGLUMI 0.1ML: HCPCS | Performed by: PSYCHIATRY & NEUROLOGY

## 2020-09-19 PROCEDURE — 74011250636 HC RX REV CODE- 250/636: Performed by: PSYCHIATRY & NEUROLOGY

## 2020-09-19 RX ORDER — GADOTERATE MEGLUMINE 376.9 MG/ML
13 INJECTION INTRAVENOUS
Status: COMPLETED | OUTPATIENT
Start: 2020-09-19 | End: 2020-09-19

## 2020-09-19 RX ADMIN — GADOTERATE MEGLUMINE 13 ML: 376.9 INJECTION INTRAVENOUS at 12:25

## 2020-09-21 NOTE — PROGRESS NOTES
Please let patient know that his MRI brain is normal. There is no evidence of a prior stroke, mass or bleed resulting in his headaches.

## 2020-12-08 ENCOUNTER — OFFICE VISIT (OUTPATIENT)
Dept: NEUROLOGY | Age: 25
End: 2020-12-08
Payer: SUBSIDIZED

## 2020-12-08 VITALS
OXYGEN SATURATION: 98 % | WEIGHT: 151 LBS | RESPIRATION RATE: 18 BRPM | HEIGHT: 62 IN | BODY MASS INDEX: 27.79 KG/M2 | TEMPERATURE: 98.2 F | HEART RATE: 80 BPM | DIASTOLIC BLOOD PRESSURE: 70 MMHG | SYSTOLIC BLOOD PRESSURE: 116 MMHG

## 2020-12-08 DIAGNOSIS — R51.9 CHRONIC DAILY HEADACHE: ICD-10-CM

## 2020-12-08 PROCEDURE — 99214 OFFICE O/P EST MOD 30 MIN: CPT | Performed by: PSYCHIATRY & NEUROLOGY

## 2020-12-08 RX ORDER — BUTALBITAL, ACETAMINOPHEN AND CAFFEINE 50; 325; 40 MG/1; MG/1; MG/1
1 TABLET ORAL
Qty: 15 TAB | Refills: 1 | Status: SHIPPED | OUTPATIENT
Start: 2020-12-08 | End: 2021-06-08 | Stop reason: DRUGHIGH

## 2020-12-08 RX ORDER — AMITRIPTYLINE HYDROCHLORIDE 25 MG/1
25 TABLET, FILM COATED ORAL
Qty: 30 TAB | Refills: 1 | Status: SHIPPED | OUTPATIENT
Start: 2020-12-08 | End: 2021-06-08 | Stop reason: DRUGHIGH

## 2020-12-08 NOTE — PROGRESS NOTES
Chief Complaint: headaches    History of Present Illness:   Miguel Dawson is a 26 y.o. male with no prior medical history presents to neurology clinic for evaluation of headaches. Patient reports that he started having migraine headaches approximately 2 years ago in April 2, 2018. He believes that at that time the headaches started they were not very severe and were mainly located in the front of the head. Symptoms had worsened over the course of the 2 years and he feels that they significantly worsened approximately a year ago. He also reports that he has significant memory issues when his pain is more severe and has difficulty with remembering short-term information. Headache characteristics:  Location: Bifrontal region now radiates to the top and the back of the head. Character: achy pressure   Radiation: back of headache   Intensity: On average 8-9/10  Frequency:  Everyday, worse in afternoon due to noise. States that there is no pain when he sleeps at night. Duration: all day   Triggers: noise,  No worsening reported with cough/sneeze, bending over  Alleviating factors: Nothing  Aura: none   Associated Sx with HA:  Denies nausea/vomiting, and photophobia. Patiently does have sensitivity to sound as he always denies unilateral ptosis, conjunctival injection, lacrimation, sweating  Prior treatments: ibuprofen, tylenol which he states he takes twice a week. Prior Imaging: hct normal, MRI normal   Caffeine use: None  H/O Head trauma: Prior history of trauma  Depressive or anxiety Sx: none   Stop BANG score (LACEY): 0    intepretator was used during this visit    Interval hx:   Still having headaches however they are not as severe. He started taking the medication and noticed the medication helped with the pain, however he stopped taking it. He stopped taking it because he ran out of the medication. He did not get his refill.      Medical hx  none    Surgical hx  None    Family hx:   No known Subjective


Subjective Remarks


Resting comfortably with no new complaints





Objective


Vitals





Vital Signs








  Date Time  Temp Pulse Resp B/P (MAP) Pulse Ox O2 Delivery O2 Flow Rate FiO2


 


11/2/17 04:00 99.7 93 16 145/71 (95) 95   


 


11/2/17 03:31   17     


 


11/2/17 01:21      Room Air  


 


11/2/17 00:00 99.8 86 14 129/61 (83) 95   


 


11/1/17 21:55   18     


 


11/1/17 19:00 97.8 86 16 163/78 (106) 98   


 


11/1/17 16:10 98.1 80 16 168/82 (110) 98   


 


11/1/17 12:00 97.2 81 18 163/81 (108) 99   


 


11/1/17 11:17     92   21


 


11/1/17 08:00 97.3 95 16 170/83 (112) 94   














I/O      


 


 11/1/17 11/1/17 11/1/17 11/2/17 11/2/17 11/2/17





 07:00 15:00 23:00 07:00 15:00 23:00


 


Intake Total 720 ml 240 ml 480 ml 300 ml  


 


Balance 720 ml 240 ml 480 ml 300 ml  


 


      


 


Intake Oral 720 ml 240 ml 480 ml 300 ml  


 


# Voids 4 3 3 3  


 


# Bowel Movements 2 1 0 0  








Result Diagram:  


11/1/17 0554                                                                   

             11/1/17 0554





Imaging





Last 24 hours Impressions








Hip and Pelvis X-Ray 10/29/17 1916 Signed





Impressions: 





 Service Date/Time:  Sunday, October 29, 2017 20:38 - CONCLUSION:  Angulated 

and 





 comminuted fractures of the proximal left femur and lesser trochanter     

Cristian Long MD 


 


Head CT 10/29/17 1916 Signed





Impressions: 





 Service Date/Time:  Sunday, October 29, 2017 20:47 - CONCLUSION:  Negative 





 noncontrast CT brain.     Cristian Long MD 


 


Chest X-Ray 10/29/17 1916 Signed





Impressions: 





 Service Date/Time:  Sunday, October 29, 2017 20:40 - CONCLUSION:  Stable 





 bilateral central infiltrates.     Cristian Long MD 


 


Cervical Spine CT 10/29/17 1916 Signed





Impressions: 





 Service Date/Time:  Sunday, October 29, 2017 20:47 - CONCLUSION:  No evidence 

of 





 fracture or spondylolisthesis.     Cristian Long MD 








Objective Remarks


LLE: dressings clean and dry. intact. NVI





Assessment & Plan


Assessment and Plan


1) Left intertrochanteric femur fracture s/p IMN - POD 3


   -WBAT


   -daily dressing changes


   -CM for rehab placement


   -DVT prophylaxis


   -ortho cleared for discharge  to SNF once arrangements and medically stable


   -f/u with [facility] or PA in 2 weeks











Romaine Sweet Jr. Nov 2, 2017 06:23 hx      Social History     Tobacco Use    Smoking status: Never Smoker    Smokeless tobacco: Never Used   Substance Use Topics    Alcohol use: Never     Frequency: Never        Allergies   Allergen Reactions    Pork Derived (Porcine) Rash        Prior to Admission medications    Medication Sig Start Date End Date Taking? Authorizing Provider   amitriptyline (ELAVIL) 25 mg tablet Take 1 Tab by mouth nightly. 8/28/20  Yes Heraclio Odonnell MD   butalbital-acetaminophen-caffeine (FIORICET, ESGIC) -40 mg per tablet Take 1 Tab by mouth every twelve (12) hours as needed for Headache. 8/28/20  Yes Heraclio Odonnell MD   ondansetron hcl (ZOFRAN) 4 mg tablet Take 1 Tab by mouth every eight (8) hours as needed for Nausea or Vomiting. 7/3/20  Yes Willian Swan PA-C   ibuprofen (MOTRIN) 800 mg tablet Take 1 Tab by mouth every eight (8) hours as needed for Pain. [place] sig 3/10/20  Yes Randi Lipoma, NP       Review of Systems:  General, constitutional: negative  Eyes, vision: negative  Ears, nose, throat: negative  Cardiovascular, heart: negative  Respiratory: negative  Gastrointestinal: negative  Genitourinary: negative  Musculoskeletal: negative  Skin and integumentary: negative  Psychiatric: negative  Endocrine: negative  Neurological: negative, except for HPI  Hematologic/lymphatic: negative  Allergy/immunology: negative    Visit Vitals  /70 (BP 1 Location: Left arm, BP Patient Position: At rest)   Pulse 80   Temp 98.2 °F (36.8 °C) (Temporal)   Resp 18   Ht 5' 1.93\" (1.573 m)   Wt 151 lb (68.5 kg)   SpO2 98%   BMI 27.68 kg/m²       Physical Exam:  General:  no acute distress  Neck: no carotid bruits  Lungs: clear to auscultation  Heart:  no murmurs, regular rate and rhythm   Lower extremity: no edema    Neurological exam:  Mental Status: Awake, alert, oriented to person, place and time  Attention and Concentration: able to state the days of the week backwards   Speech and Language: No dysarthria.  Able to name, repeat and follow commands   Fund of knowledge was preserved    Cranial nerves: II-XII  Pupils equal and reactive, visual fields intact by confrontation   Fundus: venous pulsations and sharp disc margins noted bilaterally  Extraocular movements intact, no evidence of nystagmus or ptosis   Facial sensation intact   Facial movements symmetric   Hearing intact to soft rub bilaterally   Shoulder shrug symmetric and strong   Tongue protrusion full and midline without fasciculation or atrophy    Motor:   Normal tone and Bulk   Drift: No evidence of pronator drift     Strength testing:   deltoid triceps biceps Wrist ext. Wrist flex. intrinsics   Right 5 5 5 5 5 5   Left 5 5 5 5 5 5      Hip flex. Hip ext. Knee ext. Knee flex Dorsi flex Plantar flex   Right  5 5 5 5 5 5   Left  5 5 5 5 5 5       Sensory:  Intact to light touch and pinprick. Reflexes:     Biceps Triceps  Brachiorad Patellar Achilles Plantar Hoffmans   Right  2 2 2 2 2 Down Neg   Left  2 2 2 2 2 Down Neg        Cerebellar testing:  No dysmetria. ; finger-to-nose and heel-to- shin testing are within normal limits. Romberg: absent    Gait: steady. Data:   INTERNAL RECORDS:  The patient's electronic medical record was reviewed. The relevant details include:    Lab Results   Component Value Date/Time    Sodium 140 07/03/2020 10:51 AM    Potassium 3.7 07/03/2020 10:51 AM    Chloride 107 07/03/2020 10:51 AM    Glucose 96 07/03/2020 10:51 AM    BUN 5 (L) 07/03/2020 10:51 AM    Creatinine 0.84 07/03/2020 10:51 AM    Calcium 9.3 07/03/2020 10:51 AM    WBC 5.8 07/03/2020 10:51 AM    HCT 48.7 07/03/2020 10:51 AM    HGB 16.7 07/03/2020 10:51 AM    PLATELET 860 56/05/4162 10:51 AM       CT Results (maximum last 3): Results from [place] encounter on 07/03/20   CT HEAD WO CONT    Narrative EXAM: CT HEAD WO CONT    INDICATION: headache, blurred vision, tinnitus    COMPARISON: None. CONTRAST: None.     TECHNIQUE: Unenhanced CT of the head was performed using 5 mm images. Brain and  bone windows were generated. Coronal and sagittal reformats. CT dose reduction  was achieved through use of a standardized protocol tailored for this  examination and automatic exposure control for dose modulation. FINDINGS:  The ventricles and sulci are normal in size, shape and configuration. . There is  no significant white matter disease. There is no intracranial hemorrhage,  extra-axial collection, or mass effect. The basilar cisterns are open. No CT  evidence of acute infarct. The bone windows demonstrate no abnormalities. The visualized portions of the  paranasal sinuses and mastoid air cells are clear. Impression IMPRESSION:   No acute intracranial abnormality           MRI Results (maximum last 3): Results from [place] [facility] encounter on 09/19/20   MRI BRAIN W WO CONT    Narrative EXAM:  MRI BRAIN W WO CONT    INDICATION:    eval for mass, stroke or bleed. COMPARISON:  None. CONTRAST: 13 ml Dotarem. TECHNIQUE:    Multiplanar multisequence acquisition without and with contrast of the brain. FINDINGS:  The ventricles are normal in size and position. There is no acute infarct,  hemorrhage, extra-axial fluid collection, or mass effect. There is no cerebellar  tonsillar herniation. Expected arterial flow-voids are present. No evidence of  abnormal enhancement. The paranasal sinuses, mastoid air cells, and middle ears are clear. The orbital  contents are within normal limits. No significant osseous or scalp lesions are  identified. Impression IMPRESSION:   No acute cranial abnormality. No sequela of prior stroke or hemorrhage. Assessment and Plan   Olesya Schwartz is a 26 y.o. male with no prior medical history presents to neurology clinic for evaluation of headaches which appear to be tension type headaches as he does not have significant migrainous features.   Does have some subjective memory complaints that occur when the pain is severe otherwise his neurological exam is unremarkable. MRI is normal     Chronic daily headaches: improved   -For abortive treatment I have recommended that patient continue with Fioricet. I recommended that he take this medication at the onset of severe headache. He should also try to limit taking this medication as it can cause rebound headaches. For preventative therapy I have given patient amitriptyline 25 mg at bedtime. This may help with some of the other symptoms he is experiencing such as lack of sleep and anxiety. We discussed the side effects of orthostatic hypotension dry eyes dry mouth. I recommended that he continue this medication   -We discussed the importance of a proper diet including plenty of fruits and vegetables as this can help with headache prevention.  -We discussed the importance of staying hydrated and drinking at least 32 to 64 ounces of water daily as this can be a cause of tension type headaches.  -We also discussed the importance of keeping a headache journal or log to identify triggers.  -We discussed the importance of sleep hygiene and attempting to get at least 6 to 8 hours of sleep daily to help with headache prevention. I have discussed the diagnosis with the patient and the intended plan as seen in the above orders. Patient is in agreement. The patient has received an after-visit summary and questions were answered concerning future plans. I have discussed medication side effects and warnings with the patient as well.         Signed By:  Dez Freeman MD     December 8, 2020

## 2021-01-14 ENCOUNTER — VIRTUAL VISIT (OUTPATIENT)
Dept: FAMILY MEDICINE CLINIC | Age: 26
End: 2021-01-14

## 2021-01-14 DIAGNOSIS — K21.00 GASTROESOPHAGEAL REFLUX DISEASE WITH ESOPHAGITIS WITHOUT HEMORRHAGE: Primary | ICD-10-CM

## 2021-01-14 RX ORDER — FAMOTIDINE 20 MG/1
20 TABLET, FILM COATED ORAL 2 TIMES DAILY
Qty: 30 TAB | Refills: 2 | Status: SHIPPED | OUTPATIENT
Start: 2021-01-14 | End: 2022-03-21

## 2021-01-14 NOTE — PROGRESS NOTES
Coordination of Care  1. Have you been to the ER, urgent care clinic since your last visit? Hospitalized since your last visit? No    2. Have you seen or consulted any other health care providers outside of the [address] [place] [address] since your last visit? Include any pap smears or colon screening. No    Does the patient need refills? YES    Learning Assessment Complete? yes  Depression Screening complete in the past 12 months? yes      Jorge Ding was  for this call.  Zaida Villaseñor RN

## 2021-01-14 NOTE — PROGRESS NOTES
HISTORY OF PRESENT ILLNESS  Carmen Perez is a 26 y.o. male w/stomach pain. [employer]  #[id number]  2 identifiers confirmed  This virtual visit was conducted via telephone. Pursuant to the emergency declaration under the [facility], [facility] authority and the [employer] Act, this Virtual  Visit was conducted to reduce the patient's risk of exposure to COVID-19 and provide continuity of care for an established patient.  Services were provided through a telephone synchronous discussion virtually to substitute for in-person clinic visit.  Due to this being a TeleHealth evaluation, many elements of the physical examination are unable to be assessed.      HPI Mr. Anupama Espino has had stomach pain on/off for a year. He went to the ER several months ago and was prescribed Ibuprofen and Zofran that was not effective. It gets worse with spicy food and caffeine so he has cut those out of his diet. The pain is described as burning. Non-smoker    Review of Systems   Constitutional: Negative. Respiratory: Negative. Cardiovascular: Negative. Gastrointestinal: Positive for abdominal pain and heartburn. Negative for blood in stool, constipation, diarrhea, nausea and vomiting. Genitourinary: Negative. Neurological: Negative. Physical Exam  Neurological:      Mental Status: He is alert and oriented to person, place, and time.    Psychiatric:         Mood and Affect: Mood normal.         Behavior: Behavior normal.         ASSESSMENT and PLAN  1. GERD  Start trial Pepcid 20mg PO BID  Discussed avoidance of spicy and acidic foods, NSAIDS, alcohol  Avoid recumbent position 2-3 hrs after eating  Mr. Anupama Espino agrees to plan

## 2021-06-08 ENCOUNTER — OFFICE VISIT (OUTPATIENT)
Dept: NEUROLOGY | Age: 26
End: 2021-06-08
Payer: SUBSIDIZED

## 2021-06-08 VITALS
TEMPERATURE: 99.1 F | WEIGHT: 155 LBS | SYSTOLIC BLOOD PRESSURE: 138 MMHG | BODY MASS INDEX: 28.41 KG/M2 | OXYGEN SATURATION: 99 % | HEART RATE: 75 BPM | DIASTOLIC BLOOD PRESSURE: 62 MMHG

## 2021-06-08 DIAGNOSIS — R11.0 NAUSEA: ICD-10-CM

## 2021-06-08 DIAGNOSIS — R51.9 CHRONIC DAILY HEADACHE: Primary | ICD-10-CM

## 2021-06-08 PROCEDURE — 99214 OFFICE O/P EST MOD 30 MIN: CPT | Performed by: PSYCHIATRY & NEUROLOGY

## 2021-06-08 RX ORDER — AMITRIPTYLINE HYDROCHLORIDE 50 MG/1
50 TABLET, FILM COATED ORAL
Qty: 90 TABLET | Refills: 2 | Status: SHIPPED | OUTPATIENT
Start: 2021-06-08 | End: 2021-11-11 | Stop reason: SINTOL

## 2021-06-08 RX ORDER — BUTALBITAL, ACETAMINOPHEN AND CAFFEINE 50; 325; 40 MG/1; MG/1; MG/1
1 TABLET ORAL
Qty: 30 TABLET | Refills: 3 | Status: SHIPPED | OUTPATIENT
Start: 2021-06-08 | End: 2022-03-21

## 2021-06-08 NOTE — PROGRESS NOTES
Chief Complaint: headaches    History of Present Illness:   Liz Estrella is a 23 y.o. male with no prior medical history presents to neurology clinic for evaluation of headaches. Patient reports that he started having migraine headaches approximately 2 years ago in April 2, 2018. He believes that at that time the headaches started they were not very severe and were mainly located in the front of the head. Symptoms had worsened over the course of the 2 years and he feels that they significantly worsened approximately a year ago. He also reports that he has significant memory issues when his pain is more severe and has difficulty with remembering short-term information. Headache characteristics:  Location: Bifrontal region now radiates to the top and the back of the head. Character: achy pressure   Radiation: back of headache   Intensity: On average 8-9/10  Frequency:  Everyday, worse in afternoon due to noise. States that there is no pain when he sleeps at night. Duration: all day   Triggers: noise,  No worsening reported with cough/sneeze, bending over  Alleviating factors: Nothing  Aura: none   Associated Sx with HA:  Denies nausea/vomiting, and photophobia. Patiently does have sensitivity to sound as he always denies unilateral ptosis, conjunctival injection, lacrimation, sweating  Prior treatments: ibuprofen, tylenol which he states he takes twice a week. Prior Imaging: hct normal, MRI normal   Caffeine use: None  H/O Head trauma: Prior history of trauma  Depressive or anxiety Sx: none   Stop BANG score (LACEY): 0    intepretator was used during this visit    Interval hx:   Still having headaches however they are not as severe. He does feel like the medication does help his symptoms. He again ran out of this medication. We did discuss other options however he would like to continue with the amitriptyline and Fioricet.     Medical hx  none    Surgical hx  None    Family hx:   No known hx      Social History     Tobacco Use    Smoking status: Never Smoker    Smokeless tobacco: Never Used   Substance Use Topics    Alcohol use: Never        Allergies   Allergen Reactions    Pork Derived (Porcine) Rash        Prior to Admission medications    Medication Sig Start Date End Date Taking? Authorizing Provider   butalbital-acetaminophen-caffeine (FIORICET, ESGIC) -40 mg per tablet Take 1 Tab by mouth every twelve (12) hours as needed for Headache. Patient taking differently: Take 1 Tab by mouth two (2) times a week. 12/8/20  Yes Ivan Mccarty MD   ibuprofen (MOTRIN) 800 mg tablet Take 1 Tab by mouth every eight (8) hours as needed for Pain. [place] sig 3/10/20  Yes Moriah Gray NP   famotidine (PEPCID) 20 mg tablet Take 1 Tab by mouth two (2) times a day. Patient not taking: Reported on 6/8/2021 1/14/21   Greta Coleman NP   amitriptyline (ELAVIL) 25 mg tablet Take 1 Tab by mouth nightly. Patient not taking: Reported on 1/14/2021 12/8/20   Ivan Mccarty MD   ondansetron hcl [facility]) 4 mg tablet Take 1 Tab by mouth every eight (8) hours as needed for Nausea or Vomiting.   Patient not taking: Reported on 6/8/2021 7/3/20   Angelito Ravi PA-C       Review of Systems:  General, constitutional: negative  Eyes, vision: negative  Ears, nose, throat: negative  Cardiovascular, heart: negative  Respiratory: negative  Gastrointestinal: negative  Genitourinary: negative  Musculoskeletal: negative  Skin and integumentary: negative  Psychiatric: negative  Endocrine: negative  Neurological: negative, except for HPI  Hematologic/lymphatic: negative  Allergy/immunology: negative    Visit Vitals  /62   Pulse 75   Temp 99.1 °F (37.3 °C)   Wt 155 lb (70.3 kg)   SpO2 99%   BMI 28.41 kg/m²       Physical Exam:  General:  no acute distress  Neck: no carotid bruits  Lungs: clear to auscultation  Heart:  no murmurs, regular rate and rhythm   Lower extremity: no edema    Neurological exam:  Mental Status: Awake, alert, oriented to person, place and time  Attention and Concentration: able to state the days of the week backwards   Speech and Language: No dysarthria. Able to name, repeat and follow commands   Fund of knowledge was preserved    Cranial nerves: II-XII  Pupils equal and reactive, visual fields intact by confrontation   Fundus: venous pulsations and sharp disc margins noted bilaterally  Extraocular movements intact, no evidence of nystagmus or ptosis   Facial sensation intact   Facial movements symmetric   Hearing intact to soft rub bilaterally   Shoulder shrug symmetric and strong   Tongue protrusion full and midline without fasciculation or atrophy    Motor:   Normal tone and Bulk   Drift: No evidence of pronator drift     Strength testing:   deltoid triceps biceps Wrist ext. Wrist flex. intrinsics   Right 5 5 5 5 5 5   Left 5 5 5 5 5 5      Hip flex. Hip ext. Knee ext. Knee flex Dorsi flex Plantar flex   Right  5 5 5 5 5 5   Left  5 5 5 5 5 5       Sensory:  Intact to light touch and pinprick. Reflexes:     Biceps Triceps  Brachiorad Patellar Achilles Plantar Hoffmans   Right  2 2 2 2 2 Down Neg   Left  2 2 2 2 2 Down Neg        Cerebellar testing:  No dysmetria. ; finger-to-nose and heel-to- shin testing are within normal limits. Romberg: absent    Gait: steady. Data:   INTERNAL RECORDS:  The patient's electronic medical record was reviewed. The relevant details include:    Lab Results   Component Value Date/Time    Sodium 140 07/03/2020 10:51 AM    Potassium 3.7 07/03/2020 10:51 AM    Chloride 107 07/03/2020 10:51 AM    Glucose 96 07/03/2020 10:51 AM    BUN 5 (L) 07/03/2020 10:51 AM    Creatinine 0.84 07/03/2020 10:51 AM    Calcium 9.3 07/03/2020 10:51 AM    WBC 5.8 07/03/2020 10:51 AM    HCT 48.7 07/03/2020 10:51 AM    HGB 16.7 07/03/2020 10:51 AM    PLATELET 045 32/80/4580 10:51 AM       CT Results (maximum last 3):   Results from [place] encounter on 07/03/20    CT HEAD WO CONT    Narrative  EXAM: CT HEAD WO CONT    INDICATION: headache, blurred vision, tinnitus    COMPARISON: None. CONTRAST: None. TECHNIQUE: Unenhanced CT of the head was performed using 5 mm images. Brain and  bone windows were generated. Coronal and sagittal reformats. CT dose reduction  was achieved through use of a standardized protocol tailored for this  examination and automatic exposure control for dose modulation. FINDINGS:  The ventricles and sulci are normal in size, shape and configuration. . There is  no significant white matter disease. There is no intracranial hemorrhage,  extra-axial collection, or mass effect. The basilar cisterns are open. No CT  evidence of acute infarct. The bone windows demonstrate no abnormalities. The visualized portions of the  paranasal sinuses and mastoid air cells are clear. Impression  IMPRESSION:  No acute intracranial abnormality      MRI Results (maximum last 3): Results from [place] encounter on 09/19/20    MRI BRAIN W WO CONT    Narrative  EXAM:  MRI BRAIN W WO CONT    INDICATION:    eval for mass, stroke or bleed. COMPARISON:  None. CONTRAST: 13 ml Dotarem. TECHNIQUE:  Multiplanar multisequence acquisition without and with contrast of the brain. FINDINGS:  The ventricles are normal in size and position. There is no acute infarct,  hemorrhage, extra-axial fluid collection, or mass effect. There is no cerebellar  tonsillar herniation. Expected arterial flow-voids are present. No evidence of  abnormal enhancement. The paranasal sinuses, mastoid air cells, and middle ears are clear. The orbital  contents are within normal limits. No significant osseous or scalp lesions are  identified. Impression  IMPRESSION:  No acute cranial abnormality. No sequela of prior stroke or hemorrhage.       Assessment and Plan   Licha Michael is a 23 y.o. male with no prior medical history presents to neurology clinic for evaluation of headaches which appear to be tension type headaches as he does not have significant migrainous features. Does have some subjective memory complaints that occur when the pain is severe otherwise his neurological exam is unremarkable. MRI is normal     Chronic daily headaches: improved   -For abortive treatment I have recommended that patient continue with Fioricet. I recommended that he take this medication at the onset of severe headache. He should also try to limit taking this medication as it can cause rebound headaches. For preventative therapy I have given patient amitriptyline 50 mg at bedtime. This may help with some of the other symptoms he is experiencing such as lack of sleep and anxiety. We discussed the side effects of orthostatic hypotension dry eyes dry mouth. I recommended that he continue this medication   -We discussed the importance of a proper diet including plenty of fruits and vegetables as this can help with headache prevention.  -We discussed the importance of staying hydrated and drinking at least 32 to 64 ounces of water daily as this can be a cause of tension type headaches.  -We also discussed the importance of keeping a headache journal or log to identify triggers.  -We discussed the importance of sleep hygiene and attempting to get at least 6 to 8 hours of sleep daily to help with headache prevention. I have discussed the diagnosis with the patient and the intended plan as seen in the above orders. Patient is in agreement. The patient has received an after-visit summary and questions were answered concerning future plans. I have discussed medication side effects and warnings with the patient as well.         Signed By:  Reji Alejandro MD     June 8, 2021

## 2021-06-08 NOTE — PATIENT INSTRUCTIONS
A Healthy Lifestyle: Care Instructions  Your Care Instructions     A healthy lifestyle can help you feel good, stay at a healthy weight, and have plenty of energy for both work and play. A healthy lifestyle is something you can share with your whole family. A healthy lifestyle also can lower your risk for serious health problems, such as high blood pressure, heart disease, and diabetes. You can follow a few steps listed below to improve your health and the health of your family. Follow-up care is a key part of your treatment and safety. Be sure to make and go to all appointments, and call your doctor if you are having problems. It's also a good idea to know your test results and keep a list of the medicines you take. How can you care for yourself at home? · Do not eat too much sugar, fat, or fast foods. You can still have dessert and treats now and then. The goal is moderation. · Start small to improve your eating habits. Pay attention to portion sizes, drink less juice and soda pop, and eat more fruits and vegetables. ? Eat a healthy amount of food. A 3-ounce serving of meat, for example, is about the size of a deck of cards. Fill the rest of your plate with vegetables and whole grains. ? Limit the amount of soda and sports drinks you have every day. Drink more water when you are thirsty. ? Eat plenty of fruits and vegetables every day. Have an apple or some carrot sticks as an afternoon snack instead of a candy bar. Try to have fruits and/or vegetables at every meal.  · Make exercise part of your daily routine. You may want to start with simple activities, such as walking, bicycling, or slow swimming. Try to be active 30 to 60 minutes every day. You do not need to do all 30 to 60 minutes all at once. For example, you can exercise 3 times a day for 10 or 20 minutes.  Moderate exercise is safe for most people, but it is always a good idea to talk to your doctor before starting an exercise program.  · Keep moving. Iris Hernandes the lawn, work in the garden, or [place]. Take the stairs instead of the elevator at work. · If you smoke, quit. People who smoke have an increased risk for heart attack, stroke, cancer, and other lung illnesses. Quitting is hard, but there are ways to boost your chance of quitting tobacco for good. ? Use nicotine gum, patches, or lozenges. ? Ask your doctor about stop-smoking programs and medicines. ? Keep trying. In addition to reducing your risk of diseases in the future, you will notice some benefits soon after you stop using tobacco. If you have shortness of breath or asthma symptoms, they will likely get better within a few weeks after you quit. · Limit how much alcohol you drink. Moderate amounts of alcohol (up to 2 drinks a day for men, 1 drink a day for women) are okay. But drinking too much can lead to liver problems, high blood pressure, and other health problems. Family health  If you have a family, there are many things you can do together to improve your health. · Eat meals together as a family as often as possible. · Eat healthy foods. This includes fruits, vegetables, lean meats and dairy, and whole grains. · Include your family in your fitness plan. Most people think of activities such as jogging or tennis as the way to fitness, but there are many ways you and your family can be more active. Anything that makes you breathe hard and gets your heart pumping is exercise. Here are some tips:  ? Walk to do errands or to take your child to school or the bus.  ? Go for a family bike ride after dinner instead of watching TV. Where can you learn more? Go to http://www.gray.com/  Enter G401 in the search box to learn more about \"A Healthy Lifestyle: Care Instructions. \"  Current as of: September 23, 2020               Content Version: 12.8  © 4919-3162 [employer].    Care instructions adapted under license by [employer] (which disclaims liability or warranty for this information). If you have questions about a medical condition or this instruction, always ask your healthcare professional. [address] any warranty or liability for your use of this information.

## 2021-07-02 ENCOUNTER — HOSPITAL ENCOUNTER (OUTPATIENT)
Dept: GENERAL RADIOLOGY | Age: 26
Discharge: HOME | End: 2021-07-02
Payer: SUBSIDIZED

## 2021-07-02 ENCOUNTER — HOSPITAL ENCOUNTER (OUTPATIENT)
Dept: LAB | Age: 26
Discharge: HOME | End: 2021-07-02

## 2021-07-02 ENCOUNTER — OFFICE VISIT (OUTPATIENT)
Dept: FAMILY MEDICINE CLINIC | Age: 26
End: 2021-07-02

## 2021-07-02 VITALS
BODY MASS INDEX: 28.05 KG/M2 | OXYGEN SATURATION: 99 % | TEMPERATURE: 97.8 F | DIASTOLIC BLOOD PRESSURE: 76 MMHG | HEART RATE: 66 BPM | HEIGHT: 62 IN | SYSTOLIC BLOOD PRESSURE: 116 MMHG | WEIGHT: 152.4 LBS

## 2021-07-02 DIAGNOSIS — R10.9 ABDOMINAL PAIN, UNSPECIFIED ABDOMINAL LOCATION: Primary | ICD-10-CM

## 2021-07-02 DIAGNOSIS — R10.9 ABDOMINAL PAIN, UNSPECIFIED ABDOMINAL LOCATION: ICD-10-CM

## 2021-07-02 LAB
BILIRUB UR QL STRIP: NEGATIVE
GLUCOSE UR-MCNC: NEGATIVE MG/DL
KETONES P FAST UR STRIP-MCNC: NEGATIVE MG/DL
PH UR STRIP: 7 [PH] (ref 4.6–8)
PROT UR QL STRIP: NEGATIVE
SP GR UR STRIP: 1.01 (ref 1–1.03)
UA UROBILINOGEN AMB POC: NORMAL (ref 0.2–1)
URINALYSIS CLARITY POC: CLEAR
URINALYSIS COLOR POC: YELLOW
URINE BLOOD POC: NEGATIVE
URINE LEUKOCYTES POC: NEGATIVE
URINE NITRITES POC: NEGATIVE

## 2021-07-02 PROCEDURE — 99214 OFFICE O/P EST MOD 30 MIN: CPT | Performed by: FAMILY MEDICINE

## 2021-07-02 PROCEDURE — 81002 URINALYSIS NONAUTO W/O SCOPE: CPT | Performed by: FAMILY MEDICINE

## 2021-07-02 PROCEDURE — 74018 RADEX ABDOMEN 1 VIEW: CPT

## 2021-07-02 RX ORDER — DOCUSATE SODIUM 100 MG/1
100 CAPSULE, LIQUID FILLED ORAL DAILY
Qty: 30 CAPSULE | Refills: 2 | Status: SHIPPED | OUTPATIENT
Start: 2021-07-02 | End: 2022-03-21

## 2021-07-02 NOTE — PROGRESS NOTES
Stool in colon suggestive of constipation as we discussed at visit. Other changes noted are some vague radiodensities in the Rt abdomen in the area where he has had pain for a while. This could be from chronic inflammation. Take medications as we discussed and we will recheck as scheduled.

## 2021-07-02 NOTE — PROGRESS NOTES
Perry Conde (: 1995) is a 23 y.o. male, established patient, here for evaluation of the following chief complaint(s):  Abdominal Pain       ASSESSMENT/PLAN:  1. Abdominal pain, unspecified abdominal location  Lt side pain that seems to have changed in nature about 1 month ago and is more abdominal centered. Check labs and if abn CBC/ESR would proceed with further evaluation for IBD or infection. With exam suggestive of retained stool consider overflow diarrhea from constipation. Constipation may have started with increasing Elavil 1 month ago. -     AMB POC URINALYSIS DIP STICK MANUAL W/O MICRO  -     CBC WITH AUTOMATED DIFF; Future  -     SED RATE (ESR); Future  -     XR ABD (KUB); Future      Follow-up and Dispositions    · Return in about 2 weeks (around 2021) for follow up for F2F in 2 weeks for abdominal pain. SUBJECTIVE:  HPI  Lt sided body pain/Abdominal Pain:  Lt side of body pain x 2 years. Started as burning pain in Lt groin. Pain was worse when laying on Lt side. Burning has improved but still has discomfort. In the past month he has noted more pain radiating up on Lt lumbar into Lt mid back. Stools are watery/loose x 1 month. No nausea, vomiting, hematochezia. Review of Systems   Constitutional: Negative for appetite change, chills, diaphoresis, fatigue, fever and unexpected weight change. Respiratory: Negative for cough. Cardiovascular: Negative for chest pain, palpitations and leg swelling. Gastrointestinal: Positive for abdominal pain and diarrhea. Negative for blood in stool and nausea. Genitourinary: Positive for flank pain. Negative for difficulty urinating and dysuria. Neurological: Negative for syncope and light-headedness. OBJECTIVE:  Blood pressure 116/76, pulse 66, temperature 97.8 °F (36.6 °C), height 5' 2.05\" (1.576 m), weight 152 lb 6.4 oz (69.1 kg), SpO2 99 %. Physical Exam  CONSTITUTIONAL:  Well developed.   No apparent distress. PSYCHIATRIC: Oriented to time, place, person & situation. Appropriate mood and affect. CARDIOVASCULAR:  Regular rate and rhythm. Normal S1, S2. No extra sounds. RESPIRATORY:  Normal effort. Normal ascultation without wheezing. ABDOMEN:  Normal bowel sounds. Abdomen soft, LLQ mild tenderness with mass that feels like retained stool. No guarding or rebound. No hepatosplenomegaly or masses. MUSCULOSKELETAL:  Lt hip with good flexion, Int/Ext rotation without pain. Results for orders placed or performed in visit on 07/02/21   AMB POC URINALYSIS DIP STICK MANUAL W/O MICRO   Result Value Ref Range    Color (UA POC) Yellow     Clarity (UA POC) Clear     Glucose (UA POC) Negative Negative    Bilirubin (UA POC) Negative Negative    Ketones (UA POC) Negative Negative    Specific gravity (UA POC) 1.015 1.001 - 1.035    Blood (UA POC) Negative Negative    pH (UA POC) 7.0 4.6 - 8.0    Protein (UA POC) Negative Negative    Urobilinogen (UA POC) 0.2 mg/dL 0.2 - 1    Nitrites (UA POC) Negative Negative    Leukocyte esterase (UA POC) Negative Negative         An electronic signature was used to authenticate this note.   -- Vera Roth MD

## 2021-07-02 NOTE — PROGRESS NOTES
Coordination of Care  1. Have you been to the ER, urgent care clinic since your last visit? Hospitalized since your last visit? No    2. Have you seen or consulted any other health care providers outside of the [address] [place] [address] since your last visit? Include any pap smears or colon screening. No    Does the patient need refills? NO    Learning Assessment Complete?  yes  Depression Screening complete in the past 12 months? yes     Results for orders placed or performed in visit on 07/02/21   AMB POC URINALYSIS DIP STICK MANUAL W/O MICRO   Result Value Ref Range    Color (UA POC) Yellow     Clarity (UA POC) Clear     Glucose (UA POC) Negative Negative    Bilirubin (UA POC) Negative Negative    Ketones (UA POC) Negative Negative    Specific gravity (UA POC) 1.015 1.001 - 1.035    Blood (UA POC) Negative Negative    pH (UA POC) 7.0 4.6 - 8.0    Protein (UA POC) Negative Negative    Urobilinogen (UA POC) 0.2 mg/dL 0.2 - 1    Nitrites (UA POC) Negative Negative    Leukocyte esterase (UA POC) Negative Negative

## 2021-07-02 NOTE — PROGRESS NOTES
I have copied the provider's check out note here and have reviewed these items with the patient today: Check-out Note: follow up for F2F in 2 weeks for abdominal pain   Labs ordered   Xray ordered   Medication eRxd   I have printed AVS and reviewed it with patient today. I reviewed the information for the x-ray and the information for the care card. Patient verbalized understanding.  Samantha Cano RN

## 2021-07-03 LAB
BASOPHILS # BLD: 0 K/UL (ref 0–0.1)
BASOPHILS NFR BLD: 1 % (ref 0–1)
DIFFERENTIAL METHOD BLD: NORMAL
EOSINOPHIL # BLD: 0.2 K/UL (ref 0–0.4)
EOSINOPHIL NFR BLD: 3 % (ref 0–7)
ERYTHROCYTE [DISTWIDTH] IN BLOOD BY AUTOMATED COUNT: 12.4 % (ref 11.5–14.5)
ERYTHROCYTE [SEDIMENTATION RATE] IN BLOOD: 2 MM/HR (ref 0–15)
HCT VFR BLD AUTO: 46.5 % (ref 36.6–50.3)
HGB BLD-MCNC: 15.6 G/DL (ref 12.1–17)
IMM GRANULOCYTES # BLD AUTO: 0 K/UL (ref 0–0.04)
IMM GRANULOCYTES NFR BLD AUTO: 0 % (ref 0–0.5)
LYMPHOCYTES # BLD: 2.1 K/UL (ref 0.8–3.5)
LYMPHOCYTES NFR BLD: 40 % (ref 12–49)
MCH RBC QN AUTO: 30.5 PG (ref 26–34)
MCHC RBC AUTO-ENTMCNC: 33.5 G/DL (ref 30–36.5)
MCV RBC AUTO: 90.8 FL (ref 80–99)
MONOCYTES # BLD: 0.4 K/UL (ref 0–1)
MONOCYTES NFR BLD: 7 % (ref 5–13)
NEUTS SEG # BLD: 2.6 K/UL (ref 1.8–8)
NEUTS SEG NFR BLD: 49 % (ref 32–75)
NRBC # BLD: 0 K/UL (ref 0–0.01)
NRBC BLD-RTO: 0 PER 100 WBC
PLATELET # BLD AUTO: 295 K/UL (ref 150–400)
PMV BLD AUTO: 10.8 FL (ref 8.9–12.9)
RBC # BLD AUTO: 5.12 M/UL (ref 4.1–5.7)
WBC # BLD AUTO: 5.2 K/UL (ref 4.1–11.1)

## 2021-07-07 NOTE — PROGRESS NOTES
T/C with the patient. I gave the patient the US results as per Ricardo Ahuja MD, following notes: \"Stool in colon suggestive of constipation as we discussed at visit. Other changes noted are some vague radiodensities in the Rt abdomen in the area where he has had pain for a while. This could be from chronic inflammation. Take medications as we discussed and we will recheck as scheduled. \" Patient verbalized understanding

## 2021-07-07 NOTE — PROGRESS NOTES
T/C with the patient. I gave the pt the lab results as per Dr. Rosette Hill MD, following notes: \"Labs do not suggest infection or inflammation. \" Patient told me that his Access Now card is going to  on 2021.  I sent an staff message to David Pizarro RN, to let her know about the AN renewal. Patient verbalized understanding

## 2021-07-07 NOTE — PROGRESS NOTES
CMA attempted to call the patient 5 times and pt do not answer. I left a general VM for the patient to call the office [phone].

## 2021-07-07 NOTE — PROGRESS NOTES
CMA attempted to call the patient 5 times and pt do not answer. I left a general VM for the patient to call the office [phone].

## 2021-07-16 ENCOUNTER — OFFICE VISIT (OUTPATIENT)
Dept: FAMILY MEDICINE CLINIC | Age: 26
End: 2021-07-16

## 2021-07-16 VITALS
BODY MASS INDEX: 28.35 KG/M2 | TEMPERATURE: 98.4 F | HEIGHT: 63 IN | DIASTOLIC BLOOD PRESSURE: 60 MMHG | WEIGHT: 160 LBS | OXYGEN SATURATION: 99 % | HEART RATE: 71 BPM | SYSTOLIC BLOOD PRESSURE: 115 MMHG

## 2021-07-16 DIAGNOSIS — R10.32 CHRONIC GROIN PAIN, LEFT: Primary | ICD-10-CM

## 2021-07-16 DIAGNOSIS — G89.29 CHRONIC GROIN PAIN, LEFT: Primary | ICD-10-CM

## 2021-07-16 DIAGNOSIS — R10.32 LEFT LOWER QUADRANT ABDOMINAL PAIN: ICD-10-CM

## 2021-07-16 PROCEDURE — 99213 OFFICE O/P EST LOW 20 MIN: CPT | Performed by: FAMILY MEDICINE

## 2021-07-16 RX ORDER — DOCUSATE SODIUM 100 MG
100 CAPSULE ORAL DAILY
COMMUNITY
Start: 2021-07-02 | End: 2022-03-21

## 2021-07-16 NOTE — PROGRESS NOTES
Coordination of Care  1. Have you been to the ER, urgent care clinic since your last visit? Hospitalized since your last visit? No    2. Have you seen or consulted any other health care providers outside of the [address] [place] [address] since your last visit? Include any pap smears or colon screening. No    Does the patient need refills? NO    Learning Assessment Complete?  yes  Depression Screening complete in the past 12 months? yes

## 2021-07-16 NOTE — PROGRESS NOTES
Roberto Carlos Stallworth (: 1995) is a 23 y.o. male, established patient, here for evaluation of the following chief complaint(s):  Follow-up       ASSESSMENT/PLAN:  1. Chronic groin pain, left  This has improved recently. His more recent sx seem to have abated with treatment of his constipation. The nature of his chronic groin pain, stabbing in groin but then burning in Lt buttock if he lays down suggests a neuritis. This appears to have improved since being on the Elavil since it would be an effective treatment. His labs were unremarkable and since this is improving I recommended he stay on the Elavil and just add colace daily. He may follow up for further evaluation if his symptoms recur. 2. Left lower quadrant abdominal pain  Resolved with treating constipation. SUBJECTIVE:  HPI  Lt sided body pain/Abdominal Pain:  Lt side of body pain x 2 years. Started as burning pain in Lt groin. Pain was worse when laying on Lt side. Burning would radiate to Lt buttock when he would lay down. Burning had improved over the past few months. Since taking Colace his BM have improved and his more recent lower abdominal discomfort has resolved. Reviewed labs and xray with patient. Review of Systems   Constitutional: Negative for appetite change, chills, diaphoresis, fatigue, fever and unexpected weight change. Respiratory: Negative for cough. Cardiovascular: Negative for chest pain, palpitations and leg swelling. Gastrointestinal: Negative for abdominal pain, blood in stool, diarrhea and nausea. Genitourinary: Negative for difficulty urinating, dysuria and flank pain. Neurological: Negative for syncope and light-headedness. OBJECTIVE:  Blood pressure 115/60, pulse 71, temperature 98.4 °F (36.9 °C), height 5' 2.87\" (1.597 m), weight 160 lb (72.6 kg), SpO2 99 %. Physical Exam  CONSTITUTIONAL:  Well developed. No apparent distress. PSYCHIATRIC: Oriented to time, place, person & situation. Appropriate mood and affect. ABDOMEN:  Normal bowel sounds. Abdomen soft, no masses, or retained stool. No guarding or rebound. No hepatosplenomegaly. No inguinal hernia. No results found for this visit on 07/16/21. An electronic signature was used to authenticate this note.   -- Cary Duane, MD

## 2021-07-19 ENCOUNTER — TELEPHONE (OUTPATIENT)
Dept: FAMILY MEDICINE CLINIC | Age: 26
End: 2021-07-19

## 2021-07-19 NOTE — TELEPHONE ENCOUNTER
ANA.ANA. OW called patient and started financial screening for Access Now Referral. OW mailed forms to patient. Patient will sign and mail back to OW. Pending POI.

## 2021-08-10 ENCOUNTER — TELEPHONE (OUTPATIENT)
Dept: FAMILY MEDICINE CLINIC | Age: 26
End: 2021-08-10

## 2021-08-10 NOTE — TELEPHONE ENCOUNTER
OW received pending documents from patient via mail. Patient is over income for AN. Staff message sent to provider and Belkis Richmond.

## 2021-11-11 ENCOUNTER — OFFICE VISIT (OUTPATIENT)
Dept: FAMILY MEDICINE CLINIC | Age: 26
End: 2021-11-11

## 2021-11-11 VITALS
SYSTOLIC BLOOD PRESSURE: 119 MMHG | BODY MASS INDEX: 27.64 KG/M2 | DIASTOLIC BLOOD PRESSURE: 71 MMHG | TEMPERATURE: 98 F | HEIGHT: 63 IN | OXYGEN SATURATION: 98 % | HEART RATE: 69 BPM | WEIGHT: 156 LBS

## 2021-11-11 DIAGNOSIS — R07.89 CHEST WALL PAIN: Primary | ICD-10-CM

## 2021-11-11 PROCEDURE — 99213 OFFICE O/P EST LOW 20 MIN: CPT | Performed by: FAMILY MEDICINE

## 2021-11-11 RX ORDER — NORTRIPTYLINE HYDROCHLORIDE 25 MG/1
25 CAPSULE ORAL
Qty: 30 CAPSULE | Refills: 2 | Status: SHIPPED | OUTPATIENT
Start: 2021-11-11 | End: 2022-02-11

## 2021-11-11 NOTE — PROGRESS NOTES
Coordination of Care  1. Have you been to the ER, urgent care clinic since your last visit? Hospitalized since your last visit? No    2. Have you seen or consulted any other health care providers outside of the [address] [place] [address] since your last visit? Include any pap smears or colon screening. No    Does the patient need refills? NO    Learning Assessment Complete?  yes  Depression Screening complete in the past 12 months? yes

## 2021-11-11 NOTE — PROGRESS NOTES
Elvis Burger (: 1995) is a 23 y.o. male, established patient, here for evaluation of the following chief complaint(s):  Follow-up (left lower abdomen pain and now left arm hurts and gets weak.) and Chest Pain (pt states that after eating he gets pain on left side of chest)       ASSESSMENT/PLAN:  1. Chest wall pain  Musculoskeletal with a hx of headaches. His other sx improved with Elavil but caused constipation and so will try Pamelor. Follow-up and Dispositions    · Return in about 4 weeks (around 2021) for follow up for F2F in 4 weeks for Lt sided pain. SUBJECTIVE:  HPI  Lt sided body pain/Abdominal Pain:  Lt side of body pain x 2 years. Started as burning pain in Lt groin. Lt groin pain has resolved. LLQ pain resolved with colace. He stopped Elavil because he felt it made his LLQ pain worse. Now having pain along Lt rib cage. Pain is Lt scapula, into Lt chest, Lt shoulder. Worse when he leans to the Lt or works. No pain with breathing. Headaches:  Are present but less than prior. Review of Systems   Constitutional: Negative for chills, diaphoresis, fatigue, fever and unexpected weight change. Respiratory: Negative for cough, chest tightness, shortness of breath and wheezing. Cardiovascular: Negative for palpitations and leg swelling. Neurological: Negative for syncope and light-headedness. OBJECTIVE:  Blood pressure 119/71, pulse 69, temperature 98 °F (36.7 °C), temperature source Temporal, height 5' 2.87\" (1.597 m), weight 156 lb (70.8 kg), SpO2 98 %. Physical Exam  CONSTITUTIONAL:  Well developed. No apparent distress. PSYCHIATRIC: Oriented to time, place, person & situation. Appropriate mood and affect. NECK:  Normal inspection, normal palpation without any lymphadenopathy, masses, or thyromegaly  CARDIOVASCULAR:  Regular rate and rhythm. Normal S1, S2. No extra sounds. RESPIRATORY:  Normal effort. Normal ascultation without wheezing.   VASCULAR: Normal Carotid pulses without bruits. Normal Radial pulses. EXTREMITIES:  No edema. MUSCULOSKELETAL:  Lt shoulder with normal ROM without weakness or pain. Pain reproduced on palpation of Lt thoracic paraspinal muscles. No results found for this visit on 11/11/21. An electronic signature was used to authenticate this note.   -- Justine Nguyen MD

## 2021-11-11 NOTE — PROGRESS NOTES
Pt. Seen at discharge given and reviewed AVS. The pt's medication was reviewed with him. Dalia Mcclellan was the .  Chen Solorio RN

## 2021-12-10 ENCOUNTER — OFFICE VISIT (OUTPATIENT)
Dept: FAMILY MEDICINE CLINIC | Age: 26
End: 2021-12-10

## 2021-12-10 VITALS
SYSTOLIC BLOOD PRESSURE: 114 MMHG | HEART RATE: 74 BPM | OXYGEN SATURATION: 98 % | WEIGHT: 157.8 LBS | HEIGHT: 62 IN | BODY MASS INDEX: 29.04 KG/M2 | TEMPERATURE: 98.2 F | DIASTOLIC BLOOD PRESSURE: 74 MMHG

## 2021-12-10 DIAGNOSIS — R10.32 LEFT LOWER QUADRANT ABDOMINAL PAIN: Primary | ICD-10-CM

## 2021-12-10 PROCEDURE — 99213 OFFICE O/P EST LOW 20 MIN: CPT | Performed by: FAMILY MEDICINE

## 2021-12-10 RX ORDER — METRONIDAZOLE 250 MG/1
250 TABLET ORAL 3 TIMES DAILY
Qty: 30 TABLET | Refills: 0 | Status: SHIPPED | OUTPATIENT
Start: 2021-12-10 | End: 2021-12-20

## 2021-12-10 NOTE — PROGRESS NOTES
Cristina Feliciano (: 1995) is a 23 y.o. male, established patient, here for evaluation of the following chief complaint(s):  Follow-up (lower stomach) and Thoracic Back Pain (2 month's. on left side.)       ASSESSMENT/PLAN:  1. Left lower quadrant abdominal pain  This is recurrent after Clindamycin. Consider IBS vs C. Diff or other infection unmasked by Clindamycin. Tx presumptively with Flagyl and if not improved would try Bentyl. Follow-up and Dispositions    · Return in about 4 weeks (around 2022) for follow up for F2F in 4 weeks for abdominal pain. SUBJECTIVE:  HPI  Lt sided body pain/Abdominal Pain:  Lt side of body pain x 2 years. Today he states it started after some Clindamycin pills 2 years ago (2019). It is burning pain in Lt groin/LLQ pain. He has intermittent diarrhea since that times also. Diarrhea is better when he takes the medications but recurs with certain foods. He did not feel the Pamelor helped his back or groin pain. He also gets Lt rib cage,  Lt scapula, into Lt chest, Lt shoulder but these pains are not a problem today. No pain with breathing. Review of Systems   Constitutional: Negative for chills, diaphoresis, fatigue, fever and unexpected weight change. Respiratory: Negative for cough, chest tightness, shortness of breath and wheezing. Cardiovascular: Negative for palpitations and leg swelling. Neurological: Negative for syncope and light-headedness. OBJECTIVE:  Blood pressure 114/74, pulse 74, temperature 98.2 °F (36.8 °C), temperature source Oral, height 5' 2.32\" (1.583 m), weight 157 lb 12.8 oz (71.6 kg), SpO2 98 %. Physical Exam  CONSTITUTIONAL:  Well developed. No apparent distress. PSYCHIATRIC: Oriented to time, place, person & situation. Appropriate mood and affect. No results found for this visit on 12/10/21. An electronic signature was used to authenticate this note.   -- Brayan Dunne MD

## 2021-12-10 NOTE — PROGRESS NOTES
Coordination of Care  1. Have you been to the ER, urgent care clinic since your last visit? Hospitalized since your last visit? No    2. Have you seen or consulted any other health care providers outside of the [address] [place] [address] since your last visit? Include any pap smears or colon screening. No    Does the patient need refills?  NO    Learning Assessment Complete? no  Depression Screening complete in the past 12 months? yes

## 2021-12-10 NOTE — PROGRESS NOTES
An After Visit Summary was printed and given to the patient. Coupon printed and given to patient. Patient verbalized understanding of instructions.  Skyler Funes RN

## 2022-01-06 ENCOUNTER — OFFICE VISIT (OUTPATIENT)
Dept: FAMILY MEDICINE CLINIC | Age: 27
End: 2022-01-06

## 2022-01-06 VITALS
WEIGHT: 157.8 LBS | HEART RATE: 100 BPM | OXYGEN SATURATION: 97 % | HEIGHT: 62 IN | SYSTOLIC BLOOD PRESSURE: 127 MMHG | DIASTOLIC BLOOD PRESSURE: 79 MMHG | BODY MASS INDEX: 29.04 KG/M2 | TEMPERATURE: 97.9 F

## 2022-01-06 DIAGNOSIS — R10.32 LEFT LOWER QUADRANT ABDOMINAL PAIN: Primary | ICD-10-CM

## 2022-01-06 PROCEDURE — 99213 OFFICE O/P EST LOW 20 MIN: CPT | Performed by: FAMILY MEDICINE

## 2022-01-06 RX ORDER — DICYCLOMINE HYDROCHLORIDE 10 MG/1
10 CAPSULE ORAL 3 TIMES DAILY
Qty: 90 CAPSULE | Refills: 1 | Status: SHIPPED | OUTPATIENT
Start: 2022-01-06 | End: 2022-02-11 | Stop reason: SINTOL

## 2022-01-06 NOTE — PROGRESS NOTES
An After Visit Summary was printed and given to the patient. Went over the following instructions with the patient:    follow up for F2F in 4 weeks for abdominal pain (patient will stop by registration to make an appointment)  Medication eRxd (coupon was printed and given to patient. Went over where to  his prescription and how to take his medication)    Patient verbalized understanding of discharge instructions.      Lorna Bran RN

## 2022-01-06 NOTE — PROGRESS NOTES
Coordination of Care  1. Have you been to the ER, urgent care clinic since your last visit? Hospitalized since your last visit? No    2. Have you seen or consulted any other health care providers outside of the [address] [place] [address] since your last visit? Include any pap smears or colon screening. No    Does the patient need refills? NO    Learning Assessment Complete?  yes  Depression Screening complete in the past 12 months? yes

## 2022-01-06 NOTE — PROGRESS NOTES
Hallie Fang (: 1995) is a 23 y.o. male, established patient, here for evaluation of the following chief complaint(s):  Abdominal Pain (f/up)       ASSESSMENT/PLAN:  1. Left lower quadrant abdominal pain  Much improved. Remaining discomfort which is very mild may still be IBS or radiculitis. Will give trial of Bentyl. May consider Cymbalta. Follow-up and Dispositions    · Return in about 4 weeks (around 2/3/2022) for follow up for F2F in 4 weeks for abdominal pain. SUBJECTIVE:  HPI  Lt sided body pain/Abdominal Pain:  Lt side of body pain x 2 years. Today he states it started after some Clindamycin pills 2 years ago (2019). It is burning pain in Lt groin/LLQ pain. He took the course of Flagyl which improved and his pain and diarrhea improved. His pain is better but not 100% resolved. Elavil helped some of his burning pain but made his constipation worse. Pamelor did not help. He also gets Lt rib cage,  Lt scapula, into Lt chest, Lt shoulder but these pains are not a problem today. No pain with breathing. Review of Systems   Constitutional: Negative for chills, diaphoresis, fatigue, fever and unexpected weight change. Respiratory: Negative for cough, chest tightness, shortness of breath and wheezing. Cardiovascular: Negative for palpitations and leg swelling. Neurological: Negative for syncope and light-headedness. OBJECTIVE:  Blood pressure 127/79, pulse 100, temperature 97.9 °F (36.6 °C), temperature source Temporal, height 5' 1.97\" (1.574 m), weight 157 lb 12.8 oz (71.6 kg), SpO2 97 %. Physical Exam  CONSTITUTIONAL:  Well developed. No apparent distress. PSYCHIATRIC: Oriented to time, place, person & situation. Appropriate mood and affect. ABDOMEN:  Normal bowel sounds. Abdomen soft, no masses, or retained stool. No guarding or rebound. No hepatosplenomegaly. No inguinal hernia. Pain is not reproduced today.     No results found for this visit on 01/06/22. An electronic signature was used to authenticate this note.   -- Anneliese Araya MD

## 2022-02-11 ENCOUNTER — OFFICE VISIT (OUTPATIENT)
Dept: FAMILY MEDICINE CLINIC | Age: 27
End: 2022-02-11

## 2022-02-11 VITALS
BODY MASS INDEX: 28.89 KG/M2 | HEIGHT: 62 IN | DIASTOLIC BLOOD PRESSURE: 71 MMHG | WEIGHT: 157 LBS | SYSTOLIC BLOOD PRESSURE: 118 MMHG | TEMPERATURE: 98.2 F | HEART RATE: 73 BPM | OXYGEN SATURATION: 100 %

## 2022-02-11 DIAGNOSIS — G89.29 CHRONIC LEFT-SIDED THORACIC BACK PAIN: Primary | ICD-10-CM

## 2022-02-11 DIAGNOSIS — M54.6 CHRONIC LEFT-SIDED THORACIC BACK PAIN: Primary | ICD-10-CM

## 2022-02-11 PROCEDURE — 99214 OFFICE O/P EST MOD 30 MIN: CPT | Performed by: FAMILY MEDICINE

## 2022-02-11 NOTE — PROGRESS NOTES
Explained to patient that someone from the [place] [address] will call them to explain next steps for Access Now referral once their referral has been reviewed. The referral is to see the Physical Therapy . An After Visit Summary was printed and reviewed with the patient. Patient verbalized understanding.   Sharan Sal

## 2022-02-11 NOTE — PROGRESS NOTES
Diane Sanches (: 1995) is a 23 y.o. male, established patient, here for evaluation of the following chief complaint(s):  Abdominal Pain (f/up from VV 2022) and LOW BACK PAIN (pt c/o left low back pain radiating to upper back x 3 weeks)       ASSESSMENT/PLAN:  1. Chronic left-sided thoracic back pain  -     REFERRAL TO PHYSICAL THERAPY  Since this pain is recurrent will have patient start PT. Consider cymbalta. For his recurrent LLQ abdominal pain I think this is consistent with IBS as it has responded to those treatments. Since he has SE with Bentyl may try Levsin next time. SUBJECTIVE:  HPI  Lt sided body pain/Abdominal Pain:  Lt side of body pain x 2 years. Tried Bentyl x 2 weeks but then stopped had RLQ, Rt thigh pain and so stopped them. His abdominal/groin pain has resolved and today he states his Lt thoracic back pain is flared over the past few weeks. Pain resolved after 1 week. He took the course of Flagyl which improved and his pain and diarrhea improved. His pain is better but not 100% resolved. Elavil helped some of his burning pain but made his constipation worse. Pamelor did not help. Flagyl helped. Bentyl helped but caused Rt groin pain. No pain with breathing. Review of Systems   Constitutional: Negative for chills, diaphoresis, fatigue, fever and unexpected weight change. Respiratory: Negative for cough, chest tightness, shortness of breath and wheezing. Cardiovascular: Negative for palpitations and leg swelling. Neurological: Negative for syncope and light-headedness. OBJECTIVE:  Blood pressure 118/71, pulse 73, temperature 98.2 °F (36.8 °C), temperature source Temporal, height 5' 1.97\" (1.574 m), weight 157 lb (71.2 kg), SpO2 100 %. Physical Exam  CONSTITUTIONAL:  Well developed. No apparent distress. PSYCHIATRIC: Oriented to time, place, person & situation. Appropriate mood and affect. CARDIOVASCULAR:  Regular rate and rhythm.   Normal S1, S2. No extra sounds. RESPIRATORY:  Normal effort. Normal ascultation without wheezing. VASCULAR:  Radial pulses present B.  MUSCULOSKELETAL:  Lt thoracic back pain is vague and centered mostly around lower Lt ribs. Worse with certain positions. No results found for this visit on 02/11/22. An electronic signature was used to authenticate this note.   -- Amanda Jeffery MD

## 2022-02-11 NOTE — PROGRESS NOTES
Coordination of Care  1. Have you been to the ER, urgent care clinic since your last visit? Hospitalized since your last visit? No    2. Have you seen or consulted any other health care providers outside of the [address] [place] [address] since your last visit? Include any pap smears or colon screening. No    Does the patient need refills? NO    Learning Assessment Complete?  yes  Depression Screening complete in the past 12 months? yes

## 2022-02-23 ENCOUNTER — TELEPHONE (OUTPATIENT)
Dept: FAMILY MEDICINE CLINIC | Age: 27
End: 2022-02-23

## 2022-03-21 ENCOUNTER — OFFICE VISIT (OUTPATIENT)
Dept: FAMILY MEDICINE CLINIC | Age: 27
End: 2022-03-21

## 2022-03-21 VITALS
OXYGEN SATURATION: 100 % | WEIGHT: 159 LBS | SYSTOLIC BLOOD PRESSURE: 124 MMHG | DIASTOLIC BLOOD PRESSURE: 63 MMHG | TEMPERATURE: 97.2 F | BODY MASS INDEX: 29.26 KG/M2 | HEIGHT: 62 IN | HEART RATE: 78 BPM

## 2022-03-21 DIAGNOSIS — K58.1 IRRITABLE BOWEL SYNDROME WITH CONSTIPATION: Primary | ICD-10-CM

## 2022-03-21 PROCEDURE — 99213 OFFICE O/P EST LOW 20 MIN: CPT | Performed by: FAMILY MEDICINE

## 2022-03-21 RX ORDER — AMITRIPTYLINE HYDROCHLORIDE 10 MG/1
10 TABLET, FILM COATED ORAL
Qty: 30 TABLET | Refills: 3 | Status: SHIPPED | OUTPATIENT
Start: 2022-03-21 | End: 2022-09-23 | Stop reason: SINTOL

## 2022-03-21 RX ORDER — POLYETHYLENE GLYCOL 3350 17 G/17G
17 POWDER, FOR SOLUTION ORAL DAILY
Qty: 510 G | Refills: 5 | Status: SHIPPED | OUTPATIENT
Start: 2022-03-21 | End: 2022-09-23

## 2022-03-21 NOTE — PROGRESS NOTES
Coordination of Care  1. Have you been to the ER, urgent care clinic since your last visit? Hospitalized since your last visit? No    2. Have you seen or consulted any other health care providers outside of the [address] [place] [address] since your last visit? Include any pap smears or colon screening. No    Does the patient need refills? NO    Learning Assessment Complete?  yes  Depression Screening complete in the past 12 months? yes

## 2022-03-21 NOTE — PROGRESS NOTES
HISTORY OF PRESENT ILLNESS  Raghu Orourke is a 33 y.o. male. HPI  Patient states he continues to have back pain,  Starts in the left lumbar area,  When he eats, he feels the chest pain is painful, then the left lung and down the left arm. He feels the symptoms are worse when he eats something. Right now he is not taking any medication at all. When the symptoms started he took  Docusate and feels it did help initially but then it stopped helping. He states he had x rays before that showed constipation. He is going to the bathroom once a day. Review of Systems   Cardiovascular: Positive for chest pain. Gastrointestinal: Positive for abdominal pain. Negative for heartburn, nausea and vomiting. Musculoskeletal: Positive for back pain. Left arm pain after eating   /63 (BP 1 Location: Left upper arm, BP Patient Position: Sitting)   Pulse 78   Temp 97.2 °F (36.2 °C) (Temporal)   Ht 5' 1.97\" (1.574 m)   Wt 159 lb (72.1 kg)   SpO2 100%   BMI 29.11 kg/m²   Physical Exam  Constitutional:       General: He is not in acute distress. Cardiovascular:      Rate and Rhythm: Normal rate and regular rhythm. Pulses: Normal pulses. Heart sounds: Normal heart sounds. No murmur heard. Pulmonary:      Effort: Pulmonary effort is normal.      Breath sounds: Normal breath sounds. No wheezing or rhonchi. Abdominal:      General: Bowel sounds are normal. There is no distension. Palpations: Abdomen is soft. Tenderness: There is no abdominal tenderness. Hernia: No hernia is present. Musculoskeletal:         General: No swelling or tenderness. Normal range of motion. Neurological:      Mental Status: He is alert. ASSESSMENT and PLAN  Diagnoses and all orders for this visit:    1. Irritable bowel syndrome with constipation  -     polyethylene glycol (MIRALAX) 17 gram packet; Take 1 Packet by mouth daily. -     amitriptyline (ELAVIL) 10 mg tablet;  Take 1 Tablet by mouth nightly.     Today he is asymptomatic  Symptoms are consistent with IBS-C,  We will start PEG daily,  Elavil at bedtime, low FODMAP diet discuss  Offer follow up in 1 month

## 2022-03-21 NOTE — PROGRESS NOTES
Bam Alvarez seen at d/c, given AVS and reviewed today's visit with patient along with instructions on when it is recommended to come back. I reviewed the medication list with the patient to ensure he knows how to and when to take his medications. Side effects, mechanisms of action and medication compliance were reiterated to ensure proper understanding. I have reviewed the provider's instructions with the patient, answering all questions to his satisfaction. Patient verbalized understanding.   Pedro Luis Pickard RN

## 2022-03-21 NOTE — PATIENT INSTRUCTIONS
Aprenda acerca de la dieta baja en FODMAP para el síndrome del intestino irritable  Learning About the Low FODMAP Diet for Irritable Bowel Syndrome (IBS)  ¿Qué es keith dieta baja en FODMAP? Keith dieta baja en FODMAP se [place] para averiguar si ciertos alimentos empeoran el síndrome del intestino irritable. Cassy de comer alimentos ricos en FODMAP josiah 2 a 6 semanas. Después, los reincorpora poco a poco para dariusz cómo reacciona villaseñor cuerpo. Joyce se conoce roseanna dieta de eliminación. Un dietista o un médico puede ayudarle a seguir esta dieta. Los FODMAP son tipos de carbohidratos que pueden ser difíciles de digerir para villaseñor cuerpo. Están en muchos tipos de alimentos. FODMAP significa:  · F ermentable. · O ligosacáridos. · D isacáridos. · M onosacáridos. · A demás de p olioles. Si tiene el síndrome del intestino irritable, los alimentos ricos en FODMAP pueden hacer que los síntomas empeoren. Cuando hace esta dieta, todavía puede comer carbohidratos que son bajos en FODMAP. Joyce incluye ciertas frutas, verduras, granos y productos lácteos bajos en lactosa. ¿Para qué se [place]? Esta dieta se [place] para ayudar a controlar los síntomas del síndrome del intestino irritable. La dieta limita alimentos que son ricos en FODMAP. Los alimentos ricos en FODMAP pueden ser difíciles de digerir. Llevan más líquido a los intestinos. También se fermentan con facilidad. Joyce puede provocar hinchazón, dolor de abdomen, gas y diarrea. Keith dieta baja en FODMAP puede ayudarle a descubrir qué alimentos evitar. Y puede ayudarle a encontrar alimentos que samantha más fáciles de digerir. Esta dieta puede ayudar con los síntomas del síndrome del intestino irritable. Kasia no es [place]. Todavía tendrá que controlar la afección. ¿Cómo funciona? Al principio, no comerá alimentos ricos en FODMAP josiah algunas semanas. Puede ser Darlys Deyanira con un dietista capacitado en la dieta baja en FODMAP cuando prueba esta dieta.  Puede ayudarle a encontrar recetas y listas de alimentos FODMAP para usar mientras hace la dieta. Después de 2 a 6 semanas, comenzará a probar de nuevo alimentos ricos en FODMAP. Reincorporará esos alimentos en yates Levern Griffin a la vez. El médico o dietista probablemente le hará esperar unos días antes de añadir cada alimento nuevo. Lleve un diario de alimentos. Puede anotar los alimentos que prueba y cómo le hacen sentir. Después de [employer], es posible que tenga keith mejor idea de los alimentos que debe evitar y los alimentos que puede comer sin desencadenar los síntomas del síndrome del intestino irritable. ¿Cuáles son los riesgos? Con la dieta baja en FODMAP, existe un cierto riesgo de no obtener todas las vitaminas y nutrientes que necesita. Estos incluyen:  · Folato. · Tiamina. · Vitamina B6. · Calcio. · Vitamina D. Yates médico o dietista puede ayudarle a encontrar otras hanna de estos nutrientes y vitaminas si fuera necesario. Esta dieta puede limitar el consumo de [place]. Si necesita [employer], pregúntele al médico o dietista sobre hanna de fibra bajas en FODMAP. ¿Qué alimentos gabino parte de la dieta baja en FODMAP? Aquí tiene keith guía de los alimentos que puede comer además de los alimentos que debe evitar cuando siga keith dieta baja en FODMAP. Granos  Puede comer: Alimentos hechos de granos roseanna arrurruz, thien sarraceno, harina de maíz, mijo y praveen. También puede comer harina de papa, quinua, sorgo, tapioca y teff. También [employer] cereales, la pasta, los panes, las tortillas de maíz y los productos horneados elaborados con estos granos. (Estos granos pueden estar etiquetados roseanna \"sin gluten\"). Evite: [place], la [place] y [facility]. Evite los ingredientes roseanna el bulgur, el cuscús, el thien santana y la sémola. Y evite los cereales, panes y pastas hechos de estos granos. Evite la harina de garbanzos, lentejas y arvejas (chícharos).   Proteínas  Puede comer: Andrew kirby, pescados y huevos sin salsas con alto contenido de FODMAP. Puede comer pequeñas cantidades de almendras o avellanas (10 unidades). Las nueces de [place], los cacahuates, las [place], los piñones y las nueces de nogal también están maury. También puede comer semillas de chía y calabaza, tofu y tempeh. Evite: Gannon Melanie, lentejas y soya. Evite los pistachos y los anacardos. Evite las mirta grasas o fritas. Y algunas salchichas pueden tener ingredientes ricos en FODMAP. Productos lácteos  Puede ingerir: Leche de lefty sin lactosa. La leche de arroz y la leche de almendras están maury. También puede comer yogures, kéfir y helados sin lactosa y sorbetes hechos con frutas y edulcorantes bajos en FODMAP. (Estos a menudo indican \"sin lactosa\" en la etiqueta). Puede comer pequeñas cantidades (2 cucharadas) de queso tipo \"cottage\", queso crema o queso ricota. [facility], Martin Chip y suizo están maury. También lo están las pequeñas cantidades (1 onza) de quesos curados o maduros roseanna el queso [place], el queso garry y el queso feta. Evite: [facility], incluida la de [place], [place] y [place]. Evite la [place] condensada o evaporada, el jax de [place], las [place], la crema, la crema [place], el yogur y el helado. Evite la leche de soya. (Revise las etiquetas de las salsas para dariusz si contienen ingredientes lácteos). Verduras  Puede comer: Brotes de bambú, pimientos, bok [place], brócoli, repollo (bean o victor) y pepinos. También puede comer berenjenas, judías verdes, Juliette, [facility], [place] y allen. Así roseanna calabaza, colinabo, algas, brotes, acelgas y espinacas. Puede comer cebolletas (solo la parte shelia) y calabaza amarilla o espagueti. Puede comer tomates, nabos, berros, batatas (camotes) y calabacín. También puede comer pequeñas cantidades de corazones de alcachofa (de harman, 1 oz), zanahorias, maíz (½ mazorca) y papa vishal (½ taza).   Evite: [place], espárragos, coles de Bruselas, col de Izella Busing, coliflor y apio. Y evite el ajo, los puerros, los [address], el quingombó (\"okra\"), las BAKEBARE, los cebollinos (parte kenny), las chalotas y las arvejas (chícharos). Frutas  Puede comer: Bananas, arándanos azules, melón cantalupo, uvas y [employer] [facility] [employer]. También [facility], los wolf, las [place], las [place], el [place], la papaya y la romero. Puede comer plátano macho, frambuesas, ruibarbo, fruta zulma, fresas, tangelo y Katie. También puede comer pequeñas cantidades de chips de banana deshidratada (hasta 10 chips), arándanos rojos secos (1 cucharada) y morales rallado (hasta ¼ de taza). Evite: [employer], la compota de [place], los albaricoques ([employer]), los aguacates (palta), las zarzamoras, las bayas de [place] y las cerezas. Evite [employer], los higos, las Alfonso Real, las [place], los lichis y [employer]. No coma nectarinas, duraznos, peras, caquis, ciruelas frescas, ciruelas pasas, tamarillo o sandía. Y limite la mayoría de las frutas enlatadas y secas. Aceites, especias, condimentos y edulcorantes  Puede comer: Aceites vegetales (incluidos los infundidos con ajo), [place], [place] clarPrime Healthcare Services – North Vista Hospitalada (ghee), manteca y [place]. Puede comer la mayoría de las hierbas frescas roseanna [place], [place], [place], jenbre, perejil, quinones y tomillo. Puede comer chavez, mermelada hecha con frutas bajas en FODMAP, mayonesa y Erit[place]an [place]. También están maury la salsa de soya, la salsa picante (sin ajo), la tamari y el vinagre. Los edulcorantes que están maury incluyen el azúcar (sacarosa), el azúcar en polvo (azúcar glas), el azúcar moise, la glucosa y el jarabe de lópez. También puede ingerir algunos edulcorantes artificiales roseanna el aspartamo, la sacarina y la stevia. Evite: Los chutneys, el hummus (pasta de garbanzos), las [place], las salsas de ajo y las salsas de carne con cebolla o ajo.  Evite los pepinillos, condimentos de pepinillo (\"relish\"), algunos aderezos para ensaladas y caldos para sopa, la salsa mexicana y la pasta de [place]. Y evite las salsas y otros alimentos con jarabe de maíz rico en fructosa, miel, melaza y agave. Evite los edulcorantes artificiales (isomalt, manitol, malitol, sorbitol y xilitol). Evite los sólidos de Elizabeth Janet de [place], la fructosa, el concentrado de jugo de frutas y la polidextrosa. Otros alimentos y bebidas  Puede ingerir: Willaim Daunt, agua con gas, [place], refrescos endulzados con azúcar, ½ taza de jugo de fruta bajo en FODMAP y la mayoría de los tés y alcoholes. También puede comer alimentos hechos con levadura en polvo y bicarbonato sódico, cacao y gelatina. Evite: Los jugos de frutas y verduras ricas en FODMAP. Y evite los vinos fortificados, el té de [facility] e [facility], las bebidas a base de [facility] y los sucedáneos del café y los cubitos de [facility]. La atención de seguimiento es keith parte clave de villaseñor tratamiento y seguridad. Asegúrese de hacer y acudir a todas las citas, y llame a villaseñor médico si está teniendo problemas. También es keith buena idea saber los resultados de manuela exámenes y mantener keith lista de los medicamentos que jd. ¿Dónde puede encontrar más información en inglés? Jenelle rodriguez [email or web address].com/  Meredith L235 en la búsqueda para aprender más acerca de \"Aprenda acerca de la dieta baja en FODMAP para el síndrome del intestino irritable. \"  Revisado: 8 septiembre, 2021               Versión del contenido: 13.2  © 2006-2022 Healthwise, Incorporated. Las instrucciones de cuidado fueron adaptadas bajo licencia por [employer] (which disclaims liability or warranty for this information). Si usted tiene [place] afección médica o sobre estas instrucciones, siempre pregunte a villaseñor profesional de petrona. Healthwise, Incorporated niega toda garantía o responsabilidad por villaseñor uso de esta información.

## 2022-05-13 ENCOUNTER — OFFICE VISIT (OUTPATIENT)
Dept: FAMILY MEDICINE CLINIC | Age: 27
End: 2022-05-13

## 2022-05-13 VITALS
HEART RATE: 73 BPM | BODY MASS INDEX: 28.52 KG/M2 | SYSTOLIC BLOOD PRESSURE: 110 MMHG | WEIGHT: 155 LBS | DIASTOLIC BLOOD PRESSURE: 66 MMHG | TEMPERATURE: 98.5 F | HEIGHT: 62 IN | OXYGEN SATURATION: 98 %

## 2022-05-13 DIAGNOSIS — N45.1 EPIDIDYMITIS: Primary | ICD-10-CM

## 2022-05-13 PROCEDURE — 99213 OFFICE O/P EST LOW 20 MIN: CPT | Performed by: FAMILY MEDICINE

## 2022-05-13 RX ORDER — DOXYCYCLINE 100 MG/1
100 TABLET ORAL 2 TIMES DAILY
Qty: 20 TABLET | Refills: 0 | Status: SHIPPED | OUTPATIENT
Start: 2022-05-13 | End: 2022-05-23

## 2022-05-13 RX ORDER — CEFIXIME 400 MG/1
800 CAPSULE ORAL ONCE
Qty: 2 CAPSULE | Refills: 0 | Status: SHIPPED | OUTPATIENT
Start: 2022-05-13 | End: 2022-05-13

## 2022-05-13 NOTE — PROGRESS NOTES
Christopher Patel (: 1995) is a 33 y.o. male, established patient, here for evaluation of the following chief complaint(s):  Abdominal Pain (Left side abdominal pain f/up)       ASSESSMENT/PLAN:  1. Epididymitis  Tx with Suprax and Doxycycline. Follow up if not improved. SUBJECTIVE:  HPI  Lt sided body pain/Abdominal Pain:  Lt side of body pain x 2 years. Saw Dr Tunde Saleem with Lt back to Lt abdominal pain which was worse after eating. Started on Elavil and Miralax which he took for 1 months and Lt arm and chest sx improved. Today is complaining on Lt groin burning, daily. It is mild but still present. His abdominal/groin pain had almost resolved with a course of Flagyl. Denies diarrhea. He states it did not improve while he was taking Elavil. He feels this discomfort does interfere with having sexual relations with women. Review of Systems   Genitourinary: Negative for difficulty urinating, dysuria, genital sores, hematuria, penile discharge, penile pain and penile swelling. OBJECTIVE:  Blood pressure 110/66, pulse 73, temperature 98.5 °F (36.9 °C), temperature source Temporal, height 5' 1.69\" (1.567 m), weight 155 lb (70.3 kg), SpO2 98 %. Physical Exam  CONSTITUTIONAL:  Well developed. No apparent distress. PSYCHIATRIC: Oriented to time, place, person & situation. Appropriate mood and affect. ABDOMEN:  Normal bowel sounds. Abdomen soft, non tender. :  No groin or upper thigh lymphadenopathy. No inguinal hernia. Discomfort is most reproduced along [employer]. No results found for this visit on 22. An electronic signature was used to authenticate this note.   -- Aura Mccabe MD

## 2022-05-13 NOTE — PROGRESS NOTES
Coordination of Care  1. Have you been to the ER, urgent care clinic since your last visit? Hospitalized since your last visit? No    2. Have you seen or consulted any other health care providers outside of the [address] [place] [address] since your last visit? Include any pap smears or colon screening. No    Does the patient need refills? NO    Learning Assessment Complete?  yes  Depression Screening complete in the past 12 months? yes

## 2022-05-13 NOTE — PROGRESS NOTES
An After Visit Summary was printed and given to the patient. Explained how to take medications per MD order.

## 2022-06-16 ENCOUNTER — TELEPHONE (OUTPATIENT)
Dept: FAMILY MEDICINE CLINIC | Age: 27
End: 2022-06-16

## 2022-06-16 NOTE — TELEPHONE ENCOUNTER
The faxed over [facility]. The form was not a refill, but it was about the Cefixime 400 mg capsules 2 capsules. This was ordered on 05/17/22. The pharmacy had a note at the bottom that this medication had not been available can you please send an alternative. The provider Dr Alecia Calderón was contacted.  Sosa Trujillo RN

## 2022-07-26 ENCOUNTER — OFFICE VISIT (OUTPATIENT)
Dept: FAMILY MEDICINE CLINIC | Age: 27
End: 2022-07-26

## 2022-07-26 VITALS
SYSTOLIC BLOOD PRESSURE: 120 MMHG | BODY MASS INDEX: 28.52 KG/M2 | TEMPERATURE: 98.1 F | OXYGEN SATURATION: 99 % | DIASTOLIC BLOOD PRESSURE: 66 MMHG | HEART RATE: 76 BPM | WEIGHT: 155 LBS | HEIGHT: 62 IN

## 2022-07-26 DIAGNOSIS — G89.29 CHRONIC LEFT-SIDED THORACIC BACK PAIN: Primary | ICD-10-CM

## 2022-07-26 DIAGNOSIS — M54.6 CHRONIC LEFT-SIDED THORACIC BACK PAIN: Primary | ICD-10-CM

## 2022-07-26 PROCEDURE — 99213 OFFICE O/P EST LOW 20 MIN: CPT | Performed by: FAMILY MEDICINE

## 2022-07-26 RX ORDER — PREDNISONE 20 MG/1
40 TABLET ORAL
Qty: 10 TABLET | Refills: 0 | Status: SHIPPED | OUTPATIENT
Start: 2022-07-26 | End: 2022-07-31

## 2022-07-26 NOTE — PROGRESS NOTES
Coordination of Care  1. Have you been to the ER, urgent care clinic since your last visit? Hospitalized since your last visit? No    2. Have you seen or consulted any other health care providers outside of the [address] [place] [address] since your last visit? Include any pap smears or colon screening. No    Does the patient need refills? NO    Learning Assessment Complete?  yes  Depression Screening complete in the past 12 months? yes

## 2022-07-26 NOTE — PROGRESS NOTES
Shaneka Blount is a 33 y.o. male   Chief Complaint   Patient presents with    Flank Pain     Pt c/o left flank pain radiating to left arm x 1 month         ASSESSMENT AND PLAN:    1. Chronic left-sided thoracic back pain  Amitriptyline was partially effective but overly sedating. Otherwise, has not found relief. Associated GI and  symptoms have resolved. Will trial a steroid burst.  IF persists, consider PT +/- imaging.    - predniSONE (DELTASONE) 20 mg tablet; Take 2 Tablets by mouth daily (with breakfast) for 5 days. Dispense: 10 Tablet; Refill: 0          SUBJECTIVE:    HPI:  Shaneka Blount is a 33 y.o. male who presents with pain to his left side and arm. He states it started 1.5- 2 years ago and he has been seen previously for it. He was given a medication (amitriptyline) which helped a little but he stopped taking it because he didn't like taking medications for so long. He currently denies urinary symptoms, groin pain, constipation and abdominal pain -- which all have been associated in the past.  Per his chart, IBS-C was suspected. No accident, fall or provoking injury. Pain is worsened after a long day of work and with certain movements, but it is constant. He hasn't found anything to alleviate it. He never went to physical therapy. He recently developed pain on the lateral side of his left heel that is worse with walking. Review of Systems   Constitutional:  Negative for fever and malaise/fatigue. Eyes:  Negative for blurred vision. Respiratory:  Negative for cough and shortness of breath. Cardiovascular:  Negative for chest pain, palpitations and leg swelling. Gastrointestinal:  Negative for abdominal pain, constipation, diarrhea, nausea and vomiting. Genitourinary:  Positive for flank pain. Negative for dysuria, frequency and urgency. Musculoskeletal:  Positive for back pain and myalgias. Skin:  Negative for itching and rash.    Neurological:  Negative for dizziness, tingling, sensory change, weakness and headaches. /66 (BP 1 Location: Left upper arm, BP Patient Position: Sitting)   Pulse 76   Temp 98.1 °F (36.7 °C) (Temporal)   Ht 5' 1.69\" (1.567 m)   Wt 155 lb (70.3 kg)   SpO2 99%   BMI 28.63 kg/m²     Physical Exam  Constitutional:       General: He is not in acute distress. Appearance: Normal appearance. Eyes:      Extraocular Movements: Extraocular movements intact. Conjunctiva/sclera: Conjunctivae normal.      Pupils: Pupils are equal, round, and reactive to light. Cardiovascular:      Rate and Rhythm: Normal rate and regular rhythm. Pulses: Normal pulses. Heart sounds: Normal heart sounds. Pulmonary:      Effort: Pulmonary effort is normal.      Breath sounds: Normal breath sounds. Musculoskeletal:      Comments: Region of pain is lateral thoracic area and tricep. No tenderness to palpation. No skin changes. ROM of shoulder elbow intact. No midline TTP. Strength 5/5 throughout  DTR intact. Sensation to light touch intact. Neurological:      Mental Status: He is alert.

## 2022-07-26 NOTE — PROGRESS NOTES
I have printed AVS and reviewed it with patient today. Patient verbalized understanding. I reviewed with patient medications sent to pharmacy and how the medication is taken. Patient verbalized understanding. The patient was given coupons for prescriptions and I explained how the coupons are used. Patient verbalized understanding. I instructed patient to schedule a follow-up appointment prior to leaving today. Patient verbalized understanding. Patient correctly stated his full name and date of birth prior to the information shared.  [id number] with the [facility] service assisted with this discharge.   Linda Girard RN

## 2022-08-26 ENCOUNTER — OFFICE VISIT (OUTPATIENT)
Dept: FAMILY MEDICINE CLINIC | Age: 27
End: 2022-08-26

## 2022-08-26 VITALS
OXYGEN SATURATION: 98 % | SYSTOLIC BLOOD PRESSURE: 117 MMHG | BODY MASS INDEX: 29.59 KG/M2 | WEIGHT: 160.2 LBS | TEMPERATURE: 98.1 F | DIASTOLIC BLOOD PRESSURE: 62 MMHG | HEART RATE: 94 BPM

## 2022-08-26 DIAGNOSIS — G89.29 CHRONIC LEFT FLANK PAIN: Primary | ICD-10-CM

## 2022-08-26 DIAGNOSIS — R10.9 CHRONIC LEFT FLANK PAIN: Primary | ICD-10-CM

## 2022-08-26 PROCEDURE — 99214 OFFICE O/P EST MOD 30 MIN: CPT | Performed by: FAMILY MEDICINE

## 2022-08-26 RX ORDER — LIDOCAINE 50 MG/G
1 PATCH TOPICAL EVERY 24 HOURS
Qty: 10 EACH | Refills: 0 | Status: SHIPPED | OUTPATIENT
Start: 2022-08-26 | End: 2022-09-23

## 2022-08-26 NOTE — PROGRESS NOTES
Carmen Bruce is a 33 y.o. male   Chief Complaint   Patient presents with    Thoracic Back Pain     Left sided thoracic back pain follow up. Steroid burst prescribed last visit and patient reports no changes in pain level. ASSESSMENT AND PLAN:    1. Chronic left flank pain  No improvement with steroid bursts. Chronic, x more than 2 yrs. Etiology remains unclear - posterior chest wall pain syndrome? Peripheral nerve entrapment? Renal inflammation? IBS-C (as initially diagnosed, but GI sx have resolved)? Fibromyalgia? He's gotten the most symptomatic relief from the TCA but he does not tolerate the sedation. For that reason I don't think he will find benefit from a muscle relaxant. I'll get a CT abd pelvis to rule out renal or organ cause, occult rib fracture. Try lidoderm for pain management.    - CT ABD PELV W WO CONT; Future  - lidocaine (LIDODERM) 5 %; 1 Patch by TransDERmal route every twenty-four (24) hours. Apply patch to the affected area for 12 hours a day and remove for 12 hours a day. Dispense: 10 Each; Refill: 0    SUBJECTIVE:    HPI:  Carmen Bruce is a 33 y.o. male who presents for followup. He has been suffering left sided back/flank pain for going on 3 years now with mild variations in presentation but no significant improvement. At times it has been associated with constipation and abdominal pain, at times he has had  symptoms. These have resolved with treatment but the pain persists. He has a small amount of relief from amitryptiline but didn't like the sedation and stopped. Pt rates the pain as severe. It is constant but worsened by side bends or twists. It feels deep, as though he has a knife stabbed into his CVA. No tenderness to palpation over spine or over areas of tenderness. When he lifts heavy things at work, the pain sometimes radiates up towards his neck. He denies dysuria, frequency, incontinence, urinary retention. No hematuria.   He denies constipation and abdominal pain. He has not done physical therapy. Review of Systems   Constitutional:  Negative for fever and malaise/fatigue. Eyes:  Negative for blurred vision. Respiratory:  Negative for cough and shortness of breath. Cardiovascular:  Negative for chest pain, palpitations and leg swelling. Gastrointestinal:  Negative for abdominal pain, blood in stool, constipation, diarrhea, nausea and vomiting. Genitourinary:  Positive for flank pain. Negative for dysuria, frequency, hematuria and urgency. Musculoskeletal:  Positive for back pain and myalgias. Negative for falls. Neurological:  Negative for dizziness and headaches. /62 (BP 1 Location: Left upper arm, BP Patient Position: Sitting, BP Cuff Size: Adult)   Pulse 94   Temp 98.1 °F (36.7 °C) (Temporal)   Wt 160 lb 3.2 oz (72.7 kg)   SpO2 98%   BMI 29.59 kg/m²     Physical Exam  Constitutional:       General: He is not in acute distress. Appearance: Normal appearance. Eyes:      Extraocular Movements: Extraocular movements intact. Conjunctiva/sclera: Conjunctivae normal.      Pupils: Pupils are equal, round, and reactive to light. Cardiovascular:      Rate and Rhythm: Normal rate and regular rhythm. Pulses: Normal pulses. Heart sounds: Normal heart sounds. Pulmonary:      Effort: Pulmonary effort is normal.      Breath sounds: Normal breath sounds. Abdominal:      General: Bowel sounds are normal. There is no distension. Tenderness: There is no abdominal tenderness. There is no right CVA tenderness or left CVA tenderness. Musculoskeletal:      Thoracic back: No spasms, tenderness or bony tenderness. No scoliosis. Lumbar back: No spasms or tenderness. Negative right straight leg raise test and negative left straight leg raise test.        Back:       Comments: Pain worsened with lateral torso flexion to the left. (Side bend)   Neurological:      Mental Status: He is alert.

## 2022-08-26 NOTE — PROGRESS NOTES
AVS printed and with the assistance of Icelandic interpretor Kathleen Up, reviewed with patient. CT Abd\pelvis [place] [address] [place] [facility] [place] to [facility] for Tues, Sept 6th at 2:30pm with a 2:00pm arrival time. Patient advised to check in at the Outpatient Registration in [facility]. Medications ordered today (Lidoderm patches) reviewed with patient and GoodRx coupon to use at [facility] and given to patient. Patient advised to return if symptoms worsen or fail to improve. Patient indicated understanding and appreciated the service today.

## 2022-08-26 NOTE — PROGRESS NOTES
1. Have you been to the ER, urgent care clinic since your last visit? Hospitalized since your last visit? No    2. Have you seen or consulted any other health care providers outside of the [address] [place] [address] since your last visit? Include any pap smears or colon screening.  No

## 2022-09-06 ENCOUNTER — HOSPITAL ENCOUNTER (OUTPATIENT)
Dept: CT IMAGING | Age: 27
Discharge: HOME | End: 2022-09-06
Attending: FAMILY MEDICINE
Payer: COMMERCIAL

## 2022-09-06 DIAGNOSIS — G89.29 CHRONIC LEFT FLANK PAIN: ICD-10-CM

## 2022-09-06 DIAGNOSIS — R10.9 CHRONIC LEFT FLANK PAIN: ICD-10-CM

## 2022-09-06 PROCEDURE — 74177 CT ABD & PELVIS W/CONTRAST: CPT

## 2022-09-06 PROCEDURE — 74011000636 HC RX REV CODE- 636: Performed by: FAMILY MEDICINE

## 2022-09-06 RX ADMIN — IOPAMIDOL 100 ML: 755 INJECTION, SOLUTION INTRAVENOUS at 15:06

## 2022-09-23 ENCOUNTER — OFFICE VISIT (OUTPATIENT)
Dept: FAMILY MEDICINE CLINIC | Age: 27
End: 2022-09-23

## 2022-09-23 VITALS
TEMPERATURE: 98 F | HEIGHT: 62 IN | BODY MASS INDEX: 30 KG/M2 | OXYGEN SATURATION: 100 % | HEART RATE: 65 BPM | DIASTOLIC BLOOD PRESSURE: 63 MMHG | SYSTOLIC BLOOD PRESSURE: 124 MMHG | WEIGHT: 163 LBS

## 2022-09-23 DIAGNOSIS — G89.29 CHRONIC BILATERAL BACK PAIN, UNSPECIFIED BACK LOCATION: Primary | ICD-10-CM

## 2022-09-23 DIAGNOSIS — M54.9 CHRONIC BILATERAL BACK PAIN, UNSPECIFIED BACK LOCATION: Primary | ICD-10-CM

## 2022-09-23 PROCEDURE — 99213 OFFICE O/P EST LOW 20 MIN: CPT | Performed by: FAMILY MEDICINE

## 2022-09-23 RX ORDER — DULOXETIN HYDROCHLORIDE 60 MG/1
60 CAPSULE, DELAYED RELEASE ORAL DAILY
Qty: 30 CAPSULE | Refills: 2 | Status: SHIPPED | OUTPATIENT
Start: 2022-09-23

## 2022-09-23 NOTE — PROGRESS NOTES
Todd Fowler is a 33 y.o. male   Chief Complaint   Patient presents with    Back Pain     F/u         ASSESSMENT AND PLAN:    1. Chronic bilateral back pain, unspecified back location  Migratory back pain; no longer limited to left back/flank. Reviewed normal CT. Suspect MSK etiology. Referral to PT via [facility]. Will need Care Card  (was over income for AN)  Start duloxetine daily (he did get benefit from TCA but was too sedating)    - REFERRAL TO PHYSICAL THERAPY  - DULoxetine (CYMBALTA) 60 mg capsule; Take 1 Capsule by mouth daily. Indications: chronic muscle or bone pain  Dispense: 30 Capsule; Refill: 2    SUBJECTIVE:    HPI:  Todd Fowler is a 33 y.o. male who presents for follow-up. He has been having left sided back/flank/abdominal/groin pain for about 2 years. Various treatments have helped various portions of the pain but it has never gone away completely. Lately his pain has mostly been in the left back/flank. He had CT abd/pelvis that was normal (except for fatty liver and fecal stasis)  He denies constipation. He reports daily soft, formed stools currently. Today he reports his back pain has been migrating. Sometimes it's at the left flank where it started, other times it's been upper thoracic (like today) Other times it's below his R scapula. Never completely gone. The lidoderm numbed the pain away but it always returned once he took it off. Has not done PT yet. Review of Systems   Constitutional:  Negative for fever and malaise/fatigue. Eyes:  Negative for blurred vision. Respiratory:  Negative for cough and shortness of breath. Cardiovascular:  Negative for chest pain, palpitations and leg swelling. Gastrointestinal:  Negative for abdominal pain, constipation, diarrhea, nausea and vomiting. Genitourinary: Negative. Musculoskeletal:  Positive for back pain and myalgias. Neurological:  Negative for dizziness and headaches.        /63 (BP 1 Location: Left upper arm, BP Patient Position: Sitting)   Pulse 65   Temp 98 °F (36.7 °C) (Temporal)   Ht 5' 1.69\" (1.567 m)   Wt 163 lb (73.9 kg)   SpO2 100%   BMI 30.11 kg/m²     Physical Exam  Constitutional:       General: He is not in acute distress. Appearance: Normal appearance. Eyes:      Extraocular Movements: Extraocular movements intact. Conjunctiva/sclera: Conjunctivae normal.      Pupils: Pupils are equal, round, and reactive to light. Cardiovascular:      Rate and Rhythm: Normal rate and regular rhythm. Pulses: Normal pulses. Heart sounds: Normal heart sounds. Pulmonary:      Effort: Pulmonary effort is normal.      Breath sounds: Normal breath sounds. Musculoskeletal:      Cervical back: No swelling, deformity, spasms or tenderness. Thoracic back: Tenderness present. No spasms. Lumbar back: Tenderness present. Negative right straight leg raise test and negative left straight leg raise test.   Neurological:      Mental Status: He is alert.

## 2022-09-23 NOTE — PROGRESS NOTES
Name and  confirmed w/ patient. An After Visit Summary was provided and all discharge instructions were reviewed with the patient including: new medications, side-effects, and f/up appt. RN left a message with PT per pt request and will call him when we can make an appt. Time for questions and answers provided, patient verbalized understanding. Patient discharged from clinic in stable condition. HENRRY  [facility] assisted with this d/c.

## 2022-09-23 NOTE — PROGRESS NOTES
Coordination of Care  1. Have you been to the ER, urgent care clinic since your last visit? Hospitalized since your last visit? No    2. Have you seen or consulted any other health care providers outside of the [address] [place] [address] since your last visit? Include any pap smears or colon screening. No    Does the patient need refills? NO    Learning Assessment Complete?  yes  Depression Screening complete in the past 12 months? yes

## 2022-09-28 ENCOUNTER — TELEPHONE (OUTPATIENT)
Dept: FAMILY MEDICINE CLINIC | Age: 27
End: 2022-09-28

## 2022-09-28 NOTE — TELEPHONE ENCOUNTER
RN was able to make an appt for the patient with [employer] PT at the [address] on 10/14/22 at 11:00am. Called pt to confirm the date and time, he said that this works for him. RN explained that he will still need to apply for the care card with an OW. Pt verbalized understanding. Appt information texted to pt per his request. He confirmed receipt. AN  Fariba Hairston assisted with this call.

## 2022-10-14 ENCOUNTER — APPOINTMENT (OUTPATIENT)
Dept: PHYSICAL THERAPY | Age: 27
End: 2022-10-14

## 2022-10-20 ENCOUNTER — HOSPITAL ENCOUNTER (OUTPATIENT)
Dept: PHYSICAL THERAPY | Age: 27
Discharge: HOME | End: 2022-10-20

## 2022-10-20 PROCEDURE — 97014 ELECTRIC STIMULATION THERAPY: CPT

## 2022-10-20 PROCEDURE — 97161 PT EVAL LOW COMPLEX 20 MIN: CPT

## 2022-10-20 PROCEDURE — 97110 THERAPEUTIC EXERCISES: CPT

## 2022-10-20 NOTE — THERAPY EVALUATION
Physical Therapy at [facility],   a part of [address] [place] [address], [address] [place] [address], [address] [place] [address]  Phone: [phone]  Fax: [phone]    Plan of Care/Statement of Necessity for Physical Therapy Services  2-15    Patient name: Moy Steele  : 1995  Provider#: [id number]  Referral source: Chiquita Cox MD      Medical/Treatment Diagnosis: Other low back pain [M54.59]     Prior Hospitalization: see medical history     Comorbidities: none  Prior Level of Function: Patient completed 20 minutes of exercise once or twice a week. Medications: Verified on Patient Summary List    Start of Care: 10/20/22      Onset Date: 10 months ago       The Plan of Care and following information is based on the information from the initial evaluation. Assessment/ key information: Pt is a pleasant and motivated 33year old male who was referred to skilled PT for chronic bilateral back pain. Pt reports primary complaints of constant left flank pain and intermittent central LBP. Based on examination, patient presents with various associated impairments including decreased lumbar/thoracic rotation AROM, tenderness along the left 12th rib, and impaired core/hip stability. Evaluation Complexity History LOW Complexity : Zero comorbidities / personal factors that will impact the outcome / POC; Examination LOW Complexity : 1-2 Standardized tests and measures addressing body structure, function, activity limitation and / or participation in recreation  ;Presentation MEDIUM Complexity : Evolving with changing characteristics  ; Clinical Decision Making MEDIUM Complexity : FOTO score of 26-74  Overall Complexity Rating: LOW     Problem List: pain affecting function, decrease ROM, decrease strength, decrease ADL/ functional abilitiies, decrease activity tolerance, and decrease flexibility/ joint mobility   Treatment Plan may include any combination of the following: Therapeutic exercise, Neuromuscular reeducation, Manual therapy, Therapeutic activity, Self care/home management, Electric stim unattended , and Mechanical traction  Patient / Family readiness to learn indicated by: asking questions, trying to perform skills, and interest  Persons(s) to be included in education: patient (P)  Barriers to Learning/Limitations: None  Patient Goal (s): Gain function back  Patient Self Reported Health Status: good  Rehabilitation Potential: good    Short and Long Term Goals: To be accomplished in 16 treatments:   1. Pt will be independent and compliant with HEP. 2. Pt will improve FOTO score by the MCID from 63 to >/= 74 demonstrating improved overall function with decreased pain or discomfort. 3. Pt will be able to ambulate >/= 30 minutes without increased left flank pain. 4. Pt will demonstrate correct squatting and lifting mechanics to decreased strain on back. 5. Pt will be able to lift items >/= 15 lbs without increased back pain. 6. Pt will be able to play soccer without increased back pain. Frequency / Duration: Patient to be seen 1-2 times per week for 16 treatments. Patient/ Caregiver education and instruction: self care, activity modification and exercises    [x]  Plan of care has been reviewed with CYNTHIA Barnard PT, DPT 10/20/2022     ________________________________________________________________________    I certify that the above Therapy Services are being furnished while the patient is under my care. I agree with the treatment plan and certify that this therapy is necessary.     Physician's Signature:____________________  Date:____________Time: _________      Kassidy Santamaria MD

## 2022-10-20 NOTE — PROGRESS NOTES
PT INITIAL EVALUATION NOTE - [facility] 2-15    Patient Name: Jacob Credit  IALF:  : 1995  [x]  Patient  Verified  Payor: /    In time: 1:12   Out time: 2:08 pm  Total Treatment Time (min): 56  Total Timed Codes (min): 10  1:1 Treatment Time ( [address] only): --   Visit #: 1     Treatment Area: Other low back pain [M54.59]    SUBJECTIVE  Pain Level (0-10 scale): 5  Any medication changes, allergies to medications, adverse drug reactions, diagnosis change, or new procedure performed?: [] No    [x] Yes (see summary sheet for update)  Subjective:    Pt was referred to skilled PT for LBP. Today, Pt reports primary complaints of constant left flank pain and intermittent central LBP. No symptoms in LEs. Pain is a bit better over past month since doctor's appt when he started taking cymbalta. Mechanism of Injury: 10 months ago; insidious onset. Aggravating factors include lifting heavy objects, right side-bending, left side-lying, squatting, standing up straight, quick movements/turns. Relieving factors include prone lying, slight lean forward in standing    PLOF: Plays soccer  Previous Treatment/Compliance: None    Work Hx: full-time PMHx/Surgical Hx: normal.      CT scan of abdomen: No acute intraperitoneal process is identified. Pt Goals: Be able to walk better; gain function back     OBJECTIVE    Posture:  Mild slouched  Other Observations:  --  Gait and Functional Mobility: L trendelenburg, L flank pain  SLS: R: Normal   L: Normal, pain  Palpation: TTP L rib 12; T12-L4 L transverse processes        Lumbar AROM:          R  L    Flexion    100%      Extension   100% no change      Side Bending   75% mild L flank p! 75% mild p! Rotation   100%  50% p! Thoracic rotation: R: 75% p!   L: 50%    LOWER QUARTER   MUSCLE STRENGTH  KEY       R  L  0 - No Contraction  L1, L2 Psoas  5  5  1 - Trace   L3 Quads  5  5  2 - Poor   L4 Tib Ant  5  5  3 - Fair    L5 EHL  5  5  4 - Good   S1 Peroneals  5  5  5 - Normal   S2 Hams  5  5         MMT:               HIP Ext: 5 ERNIE              HIP Abd: L: 4-; R: 4+      Modality rationale: decrease pain and increase tissue extensibility to improve the patients ability to perform functional activities with decreased pain or discomfort. Min Type Additional Details   10 [x] Estim: []Att   [x]Unatt        []TENS instruct                  []IFC  [x]Premod   []NMES                     []Other:  []w/US   []w/ice   [x]w/heat  Position: R side-lying  Location: L flank    []  Traction: [] Cervical       []Lumbar                       [] Prone          []Supine                       []Intermittent   []Continuous Lbs:  [] before manual  [] after manual  []w/heat    []  Ultrasound: []Continuous   [] Pulsed at:                           []1MHz   []3MHz Location:  W/cm2:    [] Paraffin         Location:   []w/heat    []  Ice     []  Heat  []  Ice massage Position:  Location:    []  Laser  []  Other: Position:  Location:      []  Vasopneumatic Device Pressure:       [] lo [] med [] hi   Temperature:      [x] Skin assessment post-treatment:  [x]intact []redness- no adverse reaction    []redness - adverse reaction:     10 min Therapeutic Exercise:  [] See flow sheet :   Rationale: increase ROM and increase strength to improve the patients ability to perform functional activities with decreased pain or discomfort. With   [] TE   [] TA   [] Neuro   [] SC   [] other: Patient Education: [x] Review HEP    [] Progressed/Changed HEP based on:   [] positioning   [] body mechanics   [] transfers   [] heat/ice application    [x] other: Educated Pt regarding impairments, role of PT, and POC.        Other Objective/Functional Measures: FOTO Functional Measure: 63/100                     Pain Level (0-10 scale) post treatment: 4    ASSESSMENT/Changes in Function:     [x]  See Plan of [address] Wong, PT, DPT 10/20/2022

## 2022-10-28 ENCOUNTER — TELEPHONE (OUTPATIENT)
Dept: FAMILY MEDICINE CLINIC | Age: 27
End: 2022-10-28

## 2022-10-28 NOTE — TELEPHONE ENCOUNTER
OW called patient to assist with bills and was unable to speak with patient. Left a vm asking to call OW back.

## 2022-11-03 ENCOUNTER — TELEPHONE (OUTPATIENT)
Dept: FAMILY MEDICINE CLINIC | Age: 27
End: 2022-11-03

## 2022-11-03 ENCOUNTER — APPOINTMENT (OUTPATIENT)
Dept: PHYSICAL THERAPY | Age: 27
End: 2022-11-03

## 2022-11-03 NOTE — TELEPHONE ENCOUNTER
OW called patient and started financial screening for FA. An appt was made for 11/11/22. Pending POI and bank statement.

## 2022-11-10 ENCOUNTER — HOSPITAL ENCOUNTER (OUTPATIENT)
Dept: PHYSICAL THERAPY | Age: 27
Discharge: HOME | End: 2022-11-10

## 2022-11-10 PROCEDURE — 97014 ELECTRIC STIMULATION THERAPY: CPT

## 2022-11-10 PROCEDURE — 97110 THERAPEUTIC EXERCISES: CPT

## 2022-11-10 PROCEDURE — 97140 MANUAL THERAPY 1/> REGIONS: CPT

## 2022-11-10 PROCEDURE — 97112 NEUROMUSCULAR REEDUCATION: CPT

## 2022-11-10 NOTE — PROGRESS NOTES
PT DAILY TREATMENT NOTE - [facility] 2-15    Patient Name: Jacob Credit  Date:11/10/2022  : 1995  [x]  Patient  Verified  Payor: /    In time: 4:43 pm  Out time: 5:43 pm  Total Treatment Time (min): 60  Total Timed Codes (min): 50  1:1 Treatment Time ( [address] only): --   Visit #: 2    Treatment Area: Other low back pain [M54.59]    SUBJECTIVE  Pain Level (0-10 scale): 6  Any medication changes, allergies to medications, adverse drug reactions, diagnosis change, or new procedure performed?: [x] No    [] Yes (see summary sheet for update)  Subjective functional status/changes:   [] No changes reported  Pt reports he feels the same. He has not been able to do his exercises. OBJECTIVE         Modality rationale: decrease pain and increase tissue extensibility to improve the patients ability to perform functional activities with decreased pain or discomfort.     Min Type Additional Details   10 [x] Estim: []Att   [x]Unatt        []TENS instruct                  []IFC  [x]Premod   []NMES                     []Other:  []w/US   [x]w/ice   []w/heat  Position: R side-lying  Location: L flank     []  Traction: [] Cervical       []Lumbar                       [] Prone          []Supine                       []Intermittent   []Continuous Lbs:  [] before manual  [] after manual  []w/heat     []  Ultrasound: []Continuous   [] Pulsed at:                           []1MHz   []3MHz Location:  W/cm2:     [] Paraffin         Location:   []w/heat     []  Ice     []  Heat  []  Ice massage Position:  Location:     []  Laser  []  Other: Position:  Location:        []  Vasopneumatic Device Pressure:       [] lo [] med [] hi   Temperature:       [x] Skin assessment post-treatment:  [x]intact []redness- no adverse reaction    []redness - adverse reaction:      29 min Therapeutic Exercise:  [] See flow sheet :   Rationale: increase ROM and increase strength to improve the patients ability to perform functional activities with decreased pain or discomfort. 12 min Neuromuscular Re-education:  []  See flow sheet : PPT with biopressure feedback; core press (pain), standing rows with PPT/trunk stabilization   Rationale: increase strength and increase proprioception  to improve the patients ability to perform functional activities with decreased pain. 9 min Manual Therapy: MFR and STM of left thoracolumbar PS and multifidus musculature    Rationale: decrease pain, increase ROM, and increase tissue extensibility to improve the patients ability to perform functional activities with decreased pain. With   [] TE   [] TA   [] Neuro   [] SC   [] other: Patient Education: [x] Review HEP    [] Progressed/Changed HEP based on:   [] positioning   [] body mechanics   [] transfers   [] heat/ice application    [] other:      Other Objective/Functional Measures: --     Pain Level (0-10 scale) post treatment: 5    ASSESSMENT/Changes in Function:   TTP along left thoracolumbar PS, though symptomatic TTP still 12th rib. Pt required extensive verbal, visual and tactile cueing in addition to biopressure feedback for correct performance of PPT in supine. Pt noted increased symptomatic pain with left-sided core press, none with right-sided core press. Modified to rows with cueing for PPT and Pt able to tolerate without increased pain, though did note pain with shoulder pull-downs. Patient will continue to benefit from skilled PT services to modify and progress therapeutic interventions, address functional mobility deficits, address ROM deficits, address strength deficits, analyze and address soft tissue restrictions, analyze and cue movement patterns, and analyze and modify body mechanics/ergonomics to attain remaining goals. [x]  See Plan of Care  []  See progress note/recertification  []  See Discharge Summary         Progress towards goals / Updated goals:  Short and Long Term Goals:  To be accomplished in 16 treatments:               1. Pt will be independent and compliant with HEP. 2. Pt will improve FOTO score by the MCID from 63 to >/= 74 demonstrating improved overall function with decreased pain or discomfort. 3. Pt will be able to ambulate >/= 30 minutes without increased left flank pain. - Progressing               4. Pt will demonstrate correct squatting and lifting mechanics to decreased strain on back. 5. Pt will be able to lift items >/= 15 lbs without increased back pain. 6. Pt will be able to play soccer without increased back pain.     PLAN  [x]  Upgrade activities as tolerated     [x]  Continue plan of care  []  Update interventions per flow sheet       []  Discharge due to:_  []  Other:_      Den Ordonez PT, DPT 11/10/2022

## 2022-11-11 ENCOUNTER — OFFICE VISIT (OUTPATIENT)
Dept: FAMILY MEDICINE CLINIC | Age: 27
End: 2022-11-11

## 2022-11-11 DIAGNOSIS — Z71.89 COUNSELING AND COORDINATION OF CARE: Primary | ICD-10-CM

## 2022-11-11 PROCEDURE — 99080 SPECIAL REPORTS OR FORMS: CPT | Performed by: PHYSICIAN ASSISTANT

## 2022-11-11 NOTE — PROGRESS NOTES
FA application was completed. OW mailed it to [facility] FA.   SDOH screening done. Patient opted out.

## 2022-11-17 ENCOUNTER — HOSPITAL ENCOUNTER (OUTPATIENT)
Dept: PHYSICAL THERAPY | Age: 27
Discharge: HOME | End: 2022-11-17

## 2022-11-17 PROCEDURE — 97140 MANUAL THERAPY 1/> REGIONS: CPT

## 2022-11-17 PROCEDURE — 97014 ELECTRIC STIMULATION THERAPY: CPT

## 2022-11-17 PROCEDURE — 97110 THERAPEUTIC EXERCISES: CPT

## 2022-11-17 NOTE — PROGRESS NOTES
PT DAILY TREATMENT NOTE - [facility] 2-15    Patient Name: Raghu Orourke  Date:2022  : 1995  [x]  Patient  Verified  Payor: /    In time: 6:15 pm  Out time: 7:03 pm  Total Treatment Time (min): 48  Total Timed Codes (min): 38  1:1 Treatment Time ( [address] only): --   Visit #: 3    Treatment Area: Other low back pain [M54.59]    SUBJECTIVE  Pain Level (0-10 scale): 6  Any medication changes, allergies to medications, adverse drug reactions, diagnosis change, or new procedure performed?: [x] No    [] Yes (see summary sheet for update)  Subjective functional status/changes:   [] No changes reported  Pt reports same pain overall. OBJECTIVE         Modality rationale: decrease pain and increase tissue extensibility to improve the patients ability to perform functional activities with decreased pain or discomfort. Min Type Additional Details   10 [x] Estim: []Att   [x]Unatt        []TENS instruct                  []IFC  [x]Premod   []NMES                     []Other:  []w/US   [x]w/ice   []w/heat  Position: R side-lying  Location: L flank     []  Traction: [] Cervical       []Lumbar                       [] Prone          []Supine                       []Intermittent   []Continuous Lbs:  [] before manual  [] after manual  []w/heat     []  Ultrasound: []Continuous   [] Pulsed at:                           []1MHz   []3MHz Location:  W/cm2:     [] Paraffin         Location:   []w/heat     []  Ice     []  Heat  []  Ice massage Position:  Location:     []  Laser  []  Other: Position:  Location:        []  Vasopneumatic Device Pressure:       [] lo [] med [] hi   Temperature:       [x] Skin assessment post-treatment:  [x]intact []redness- no adverse reaction    []redness - adverse reaction:      28 min Therapeutic Exercise:  [] See flow sheet :   Rationale: increase ROM and increase strength to improve the patients ability to perform functional activities with decreased pain or discomfort.     10 min Manual Therapy: MFR and IASTM of left thoracolumbar PS and multifidus musculature; Grade V PA mid-lower thoracic manipulations   Rationale: decrease pain, increase ROM, and increase tissue extensibility to improve the patients ability to perform functional activities with decreased pain. With   [] TE   [] TA   [] Neuro   [] SC   [] other: Patient Education: [x] Review HEP    [] Progressed/Changed HEP based on:   [] positioning   [] body mechanics   [] transfers   [] heat/ice application    [] other:      Other Objective/Functional Measures: --     Pain Level (0-10 scale) post treatment: 5    ASSESSMENT/Changes in Function:   Increased turgor along left-sided thoracic lumbar PS musculature; utilized IASTM/MFR and grade V manips of thoracic spine afterwards to reduce stiffness. Reinforced with stretches and mobility exercises which Pt tolerated well; added to HEP. Patient will continue to benefit from skilled PT services to modify and progress therapeutic interventions, address functional mobility deficits, address ROM deficits, address strength deficits, analyze and address soft tissue restrictions, analyze and cue movement patterns, and analyze and modify body mechanics/ergonomics to attain remaining goals. [x]  See Plan of Care  []  See progress note/recertification  []  See Discharge Summary         Progress towards goals / Updated goals:  Short and Long Term Goals: To be accomplished in 16 treatments:               1. Pt will be independent and compliant with HEP. 2. Pt will improve FOTO score by the MCID from 63 to >/= 74 demonstrating improved overall function with decreased pain or discomfort. 3. Pt will be able to ambulate >/= 30 minutes without increased left flank pain. - Progressing               4. Pt will demonstrate correct squatting and lifting mechanics to decreased strain on back. 5. Pt will be able to lift items >/= 15 lbs without increased back pain. 6. Pt will be able to play soccer without increased back pain.     PLAN  [x]  Upgrade activities as tolerated     [x]  Continue plan of care  []  Update interventions per flow sheet       []  Discharge due to:_  []  Other:_      Liyah Evans, PT, DPT 11/17/2022

## 2022-11-30 ENCOUNTER — APPOINTMENT (OUTPATIENT)
Dept: PHYSICAL THERAPY | Age: 27
End: 2022-11-30

## 2022-12-15 ENCOUNTER — HOSPITAL ENCOUNTER (OUTPATIENT)
Dept: PHYSICAL THERAPY | Age: 27
Discharge: HOME | End: 2022-12-15
Payer: SELF-PAY

## 2022-12-15 PROCEDURE — 97110 THERAPEUTIC EXERCISES: CPT

## 2022-12-15 PROCEDURE — 97112 NEUROMUSCULAR REEDUCATION: CPT

## 2022-12-15 NOTE — PROGRESS NOTES
PT DAILY TREATMENT NOTE - [facility] 2-15    Patient Name: Jeremías Wyatt  Date:12/15/2022  : 1995  [x]  Patient  Verified  Payor: SELF PAY / Plan: [employer] FLAT RATE SELF PAY / Product Type: Self Pay /    In time: 6:12 pm  Out time: 6:55 pm  Total Treatment Time (min): 43  Total Timed Codes (min): 43  1:1 Treatment Time ( [address] only): --   Visit #: 4    Treatment Area: Other low back pain [M54.59]    SUBJECTIVE  Pain Level (0-10 scale): 6  Any medication changes, allergies to medications, adverse drug reactions, diagnosis change, or new procedure performed?: [x] No    [] Yes (see summary sheet for update)  Subjective functional status/changes:   [] No changes reported  Pt reports pain intensity a little bit better, same frequency. Still pain with lifting heavier items. OBJECTIVE     25 min Therapeutic Exercise:  [] See flow sheet :   Rationale: increase ROM and increase strength to improve the patients ability to perform functional activities with decreased pain or discomfort. 18 min Neuromuscular Re-education:  []  See flow sheet : PPT with extensive verbal and tactile cueing for correct movement; Squats with support with exercise verbal and tactile cueing for correct mechanics; core press   Rationale: increase strength, improve coordination, and increase proprioception  to improve the patients ability to perform functional activities with decreased pain.       With   [] TE   [] TA   [] Neuro   [] SC   [] other: Patient Education: [x] Review HEP    [] Progressed/Changed HEP based on:   [] positioning   [] body mechanics   [] transfers   [] heat/ice application    [] other:      Other Objective/Functional Measures:   TTP T12 spinous process and mid posterior aspect of 12th rib  Occasional tingling central spine                                                    Lumbar AROM:                                                                                               R                        L Flexion                                     100%                                         Extension                                100% no change                                               Side Bending                           75% mild L flank p! 100%     75% mild p!     75% mild left 12th rib              Rotation                                   100%                 50% p!       75%              Pain Level (0-10 scale) post treatment: 5    ASSESSMENT/Changes in Function:     []  See Plan of Care  [x]  See progress note/recertification  []  See Discharge Summary         Progress towards goals / Updated goals:  Short and Long Term Goals: To be accomplished in 16 treatments:               1. Pt will be independent and compliant with HEP. 2. Pt will improve FOTO score by the MCID from 63 to >/= 74 demonstrating improved overall function with decreased pain or discomfort. - Progressing               3. Pt will be able to ambulate >/= 30 minutes without increased left flank pain. - MET               4. Pt will demonstrate correct squatting and lifting mechanics to decreased strain on back. - Progressing               5. Pt will be able to lift items >/= 15 lbs without increased back pain. - Progressing               6. Pt will be able to play soccer without increased back pain.  - Progressing    PLAN  [x]  Upgrade activities as tolerated     [x]  Continue plan of care  []  Update interventions per flow sheet       []  Discharge due to:_  []  Other:_      Carol Lilly, PT, DPT 12/15/2022

## 2022-12-29 ENCOUNTER — HOSPITAL ENCOUNTER (OUTPATIENT)
Dept: PHYSICAL THERAPY | Age: 27
End: 2022-12-29
Payer: SELF-PAY

## 2022-12-29 PROCEDURE — 97140 MANUAL THERAPY 1/> REGIONS: CPT

## 2022-12-29 PROCEDURE — 97112 NEUROMUSCULAR REEDUCATION: CPT

## 2022-12-29 PROCEDURE — 97110 THERAPEUTIC EXERCISES: CPT

## 2022-12-29 NOTE — PROGRESS NOTES
PT DAILY TREATMENT NOTE - [facility] 15    Patient Name: Mat   Date:2022  : 1995  [x]  Patient  Verified  Payor: SELF PAY / Plan: [employer] FLAT RATE SELF PAY / Product Type: Self Pay /    In time: 5:42 pm  Out time: 6:30 pm  Total Treatment Time (min): 48  Total Timed Codes (min): 48  1:1 Treatment Time ( [address] only): --   Visit #: 5    Treatment Area: Other low back pain [M54.59]    SUBJECTIVE  Pain Level (0-10 scale): 5-6  Any medication changes, allergies to medications, adverse drug reactions, diagnosis change, or new procedure performed?: [x] No    [] Yes (see summary sheet for update)  Subjective functional status/changes:   [] No changes reported  Pt reports he is improving. Pain intensity continuing to reduce. OBJECTIVE     28 min Therapeutic Exercise:  [] See flow sheet :   Rationale: increase ROM and increase strength to improve the patients ability to perform functional activities with decreased pain or discomfort. 12 min Neuromuscular Re-education:  []  See flow sheet : Chops; core press; multifidus lifts   Rationale: increase strength, improve coordination, and increase proprioception  to improve the patients ability to perform functional activities with decreased pain. 8 min Manual Therapy: MFR and IASTM of left thoracolumbar PS and multifidus musculature   Rationale: decrease pain, increase ROM, and increase tissue extensibility to improve the patients ability to perform functional activities with decreased pain.     With   [] TE   [] TA   [] Neuro   [] SC   [] other: Patient Education: [x] Review HEP    [] Progressed/Changed HEP based on:   [] positioning   [] body mechanics   [] transfers   [] heat/ice application    [] other:      Other Objective/Functional Measures:   TTP T12 spinous process and mid posterior aspect of 12th rib  Occasional tingling central spine                                                    Lumbar AROM: Patient information on fall and injury prevention R                        L                        Flexion                                     100%                                         Extension                                100% no change                                               Side Bending                           75% mild L flank p! 100%     75% mild p!     75% mild left 12th rib              Rotation                                   100%                 50% p!       75%              Pain Level (0-10 scale) post treatment: 5    ASSESSMENT/Changes in Function:  No TTP along 12th rib today; localized TTP to left thoracolumbar PS musculature. Pt noted feeling a little pressure within left-sided back with initiation of chops exercise today, though mild and able to perform well. Patient will continue to benefit from skilled PT services to modify and progress therapeutic interventions, address functional mobility deficits, address ROM deficits, address strength deficits, analyze and address soft tissue restrictions, analyze and cue movement patterns, and analyze and modify body mechanics/ergonomics to attain remaining goals. []  See Plan of Care  [x]  See progress note/recertification  []  See Discharge Summary         Progress towards goals / Updated goals:  Short and Long Term Goals: To be accomplished in 16 treatments:               1. Pt will be independent and compliant with HEP. 2. Pt will improve FOTO score by the MCID from 63 to >/= 74 demonstrating improved overall function with decreased pain or discomfort. - Progressing               3. Pt will be able to ambulate >/= 30 minutes without increased left flank pain. - MET               4. Pt will demonstrate correct squatting and lifting mechanics to decreased strain on back. - Progressing               5. Pt will be able to lift items >/= 15 lbs without increased back pain.   - Progressing               6. Pt will be able to play soccer without increased back pain.  - Progressing    PLAN  [x]  Upgrade activities as tolerated     [x]  Continue plan of care  []  Update interventions per flow sheet       []  Discharge due to:_  []  Other:_      Mc Tyler PT, DPT 12/29/2022

## 2023-01-05 ENCOUNTER — HOSPITAL ENCOUNTER (OUTPATIENT)
Dept: PHYSICAL THERAPY | Age: 28
Discharge: HOME | End: 2023-01-05
Payer: SELF-PAY

## 2023-01-05 PROCEDURE — 97140 MANUAL THERAPY 1/> REGIONS: CPT

## 2023-01-05 PROCEDURE — 97112 NEUROMUSCULAR REEDUCATION: CPT

## 2023-01-05 PROCEDURE — 97110 THERAPEUTIC EXERCISES: CPT

## 2023-01-05 NOTE — PROGRESS NOTES
PT DAILY TREATMENT NOTE - [facility] 2-15    Patient Name: oDug Alejandre  Date:2023  : 1995  [x]  Patient  Verified  Payor: SELF PAY / Plan: [employer] FLAT RATE SELF PAY / Product Type: Self Pay /    In time: 5:36 pm  Out time: 6:33 pm  Total Treatment Time (min): 57  Total Timed Codes (min): 57  1:1 Treatment Time ([address] only): --   Visit #: 6    Treatment Area: Other low back pain [M54.59]    SUBJECTIVE  Pain Level (0-10 scale): \"comfortable pain\"  Any medication changes, allergies to medications, adverse drug reactions, diagnosis change, or new procedure performed?: [x] No    [] Yes (see summary sheet for update)  Subjective functional status/changes:   [] No changes reported  Pt reports frequency the same, but intensity is much better. Not really pain at this point, but more of a spasm feeling. OBJECTIVE     23 min Therapeutic Exercise:  [] See flow sheet :   Rationale: increase ROM and increase strength to improve the patients ability to perform functional activities with decreased pain or discomfort. 9 min Neuromuscular Re-education:  []  See flow sheet : core press; posture training; seated horizontal abduction and ER with tactile cueing for appropriate muscle activation   Rationale: increase strength, improve coordination, and increase proprioception  to improve the patients ability to perform functional activities with decreased pain. 15 min Manual Therapy: MFR and IASTM of left thoracic PS and multifidus musculature   Rationale: decrease pain, increase ROM, and increase tissue extensibility to improve the patients ability to perform functional activities with decreased pain.     With   [] TE   [] TA   [] Neuro   [] SC   [] other: Patient Education: [x] Review HEP    [] Progressed/Changed HEP based on:   [] positioning   [] body mechanics   [] transfers   [] heat/ice application    [] other:      Other Objective/Functional Measures: --    Pain Level (0-10 scale) post treatment: 2    ASSESSMENT/Changes in Function:  Pt demonstrated poor postural muscle activation and presents with poor sitting posture; required tactile cueing for scapular engagement and erect sitting posture. Prescribed postural exercises for HEP. Patient will continue to benefit from skilled PT services to modify and progress therapeutic interventions, address functional mobility deficits, address ROM deficits, address strength deficits, analyze and address soft tissue restrictions, analyze and cue movement patterns, and analyze and modify body mechanics/ergonomics to attain remaining goals. []  See Plan of Care  [x]  See progress note/recertification  []  See Discharge Summary         Progress towards goals / Updated goals:  Short and Long Term Goals: To be accomplished in 16 treatments:               1. Pt will be independent and compliant with HEP. 2. Pt will improve FOTO score by the MCID from 63 to >/= 74 demonstrating improved overall function with decreased pain or discomfort. - Progressing               3. Pt will be able to ambulate >/= 30 minutes without increased left flank pain. - MET               4. Pt will demonstrate correct squatting and lifting mechanics to decreased strain on back. - Progressing               5. Pt will be able to lift items >/= 15 lbs without increased back pain. - Progressing               6. Pt will be able to play soccer without increased back pain.  - Progressing    PLAN  [x]  Upgrade activities as tolerated     [x]  Continue plan of care  []  Update interventions per flow sheet       []  Discharge due to:_  []  Other:_      He Hernandez, PT, DPT 1/5/2023

## 2023-01-12 ENCOUNTER — HOSPITAL ENCOUNTER (OUTPATIENT)
Dept: PHYSICAL THERAPY | Age: 28
Discharge: HOME | End: 2023-01-12
Payer: SELF-PAY

## 2023-01-12 PROCEDURE — 97112 NEUROMUSCULAR REEDUCATION: CPT

## 2023-01-12 PROCEDURE — 97110 THERAPEUTIC EXERCISES: CPT

## 2023-01-12 NOTE — PROGRESS NOTES
PT DAILY TREATMENT NOTE - [facility] 2-15    Patient Name: Sally Gómez  Date:2023  : 1995  [x]  Patient  Verified  Payor: SELF PAY / Plan: [employer] FLAT RATE SELF PAY / Product Type: Self Pay /    In time: 6:23 pm  Out time: 6:57 pm  Total Treatment Time (min): 34  Total Timed Codes (min): 34  1:1 Treatment Time ( [address] only): --   Visit #: 7    Treatment Area: Other low back pain [M54.59]    SUBJECTIVE  Pain Level (0-10 scale): \"comfortable pain\"  Any medication changes, allergies to medications, adverse drug reactions, diagnosis change, or new procedure performed?: [x] No    [] Yes (see summary sheet for update)  Subjective functional status/changes:   [] No changes reported  Pt reports feeling better mobility. OBJECTIVE     24 min Therapeutic Exercise:  [] See flow sheet :   Rationale: increase ROM and increase strength to improve the patients ability to perform functional activities with decreased pain or discomfort. 10 min Neuromuscular Re-education:  []  See flow sheet : core press; multifidus lifts with tactile and verbal  cueing for technique   Rationale: increase strength, improve coordination, and increase proprioception  to improve the patients ability to perform functional activities with decreased pain. With   [] TE   [] TA   [] Neuro   [] SC   [] other: Patient Education: [x] Review HEP    [] Progressed/Changed HEP based on:   [] positioning   [] body mechanics   [] transfers   [] heat/ice application    [] other:      Other Objective/Functional Measures:   0-100: 40% - more mobility, can bend down and pick things up now with only mild discomfort (was not able to do initially); limited to lifting <20 lbs due to pain - no left flank pain in last week.  Some lower back/left hip pain recently    Hip abduction: L: 4+ mild left low back/hip pain    R: 5 no pain  Pain Level (0-10 scale) post treatment: 2    ASSESSMENT/Changes in Function:    []  See Plan of Care  [x]  See progress note/recertification  []  See Discharge Summary         Progress towards goals / Updated goals:  Short and Long Term Goals: To be accomplished in 16 treatments:               1. Pt will be independent and compliant with HEP. 2. Pt will improve FOTO score by the MCID from 63 to >/= 74 demonstrating improved overall function with decreased pain or discomfort. - Progressing               3. Pt will be able to ambulate >/= 30 minutes without increased left flank pain. - MET               4. Pt will demonstrate correct squatting and lifting mechanics to decreased strain on back. - Progressing               5. Pt will be able to lift items >/= 15 lbs without increased back pain. - Progressing (improved)               6. Pt will be able to play soccer without increased back pain.  - Progressing    PLAN  [x]  Upgrade activities as tolerated     [x]  Continue plan of care  []  Update interventions per flow sheet       []  Discharge due to:_  []  Other:_      Luke Garibay, PT, DPT 1/12/2023

## 2023-01-12 NOTE — PROGRESS NOTES
Physical Therapy at [facility],   a part of [address] [place] [address] [place]  [address] [address], [address] [place] [address], [address] [place] [address]  Phone: [phone] Fax: [phone]    Progress Note    Name: Fidencio Vieira   : 1995   MD: Lotus Healy MD       Treatment Diagnosis: Other low back pain [M54.59]  Start of Care: 10/20/22    Visits from Start of Care: 7  Missed Visits: 0    Summary of Care:Mr. Nathan Rivera has been seen for 7 skilled physical therapy visits secondary to chronic left-sided mid-low back pain. Assessment / Recommendations:        Belkis Stockton, PT, DPT 2023

## 2023-01-24 ENCOUNTER — HOSPITAL ENCOUNTER (OUTPATIENT)
Dept: PHYSICAL THERAPY | Age: 28
Discharge: HOME | End: 2023-01-24
Payer: SELF-PAY

## 2023-01-24 PROCEDURE — 97110 THERAPEUTIC EXERCISES: CPT

## 2023-01-24 PROCEDURE — 97112 NEUROMUSCULAR REEDUCATION: CPT

## 2023-01-24 NOTE — PROGRESS NOTES
PT DAILY TREATMENT NOTE - [facility] 2-15    Patient Name: Sid Due  Date:2023  : 1995  [x]  Patient  Verified  Payor: SELF PAY / Plan: [employer] FLAT RATE SELF PAY / Product Type: Self Pay /    In time: 6:19 pm  Out time: 7:03 pm  Total Treatment Time (min): 44  Total Timed Codes (min): 44  1:1 Treatment Time ([address] only): --   Visit #: 8    Treatment Area: Other low back pain [M54.59]    SUBJECTIVE  Pain Level (0-10 scale): 4  Any medication changes, allergies to medications, adverse drug reactions, diagnosis change, or new procedure performed?: [x] No    [] Yes (see summary sheet for update)  Subjective functional status/changes:   [] No changes reported  Pt reports some increased localized pain left-sided mid-back last week. OBJECTIVE     29 min Therapeutic Exercise:  [] See flow sheet :   Rationale: increase ROM and increase strength to improve the patients ability to perform functional activities with decreased pain or discomfort. 15 min Neuromuscular Re-education:  []  See flow sheet : core press; lifting/squat mechanics training with extensive verbal and visual cueing to decrease stress on back; seated core rotations   Rationale: increase strength, improve coordination, and increase proprioception  to improve the patients ability to perform functional activities with decreased pain. With   [] TE   [] TA   [] Neuro   [] SC   [] other: Patient Education: [x] Review HEP    [] Progressed/Changed HEP based on:   [] positioning   [] body mechanics   [] transfers   [] heat/ice application    [] other:      Other Objective/Functional Measures:   0-100: 60% - more mobility, can bend down and pick things up now with only mild discomfort (was not able to do initially); limited to lifting <20 lbs due to pain.  Not taking any pain medication at this point    Hip abduction: L: 4+ mild left low back/hip pain    R: 5 no pain    Pain Level (0-10 scale) post treatment: 2    ASSESSMENT/Changes in Function:    []  See Plan of Care  []  See progress note/recertification  [x]  See Discharge Summary    Progress towards goals / Updated goals:  Short and Long Term Goals: To be accomplished in 16 treatments:               1. Pt will be independent and compliant with HEP. - MET               2. Pt will improve FOTO score by the MCID from 63 to >/= 74 demonstrating improved overall function with decreased pain or discomfort. - Not assessed               3. Pt will be able to ambulate >/= 30 minutes without increased left flank pain. - MET               4. Pt will demonstrate correct squatting and lifting mechanics to decreased strain on back. - MET               5. Pt will be able to lift items >/= 15 lbs without increased back pain. - MET               6. Pt will be able to play soccer without increased back pain.  - Not attempted    PLAN  [x]  Upgrade activities as tolerated     [x]  Continue plan of care  []  Update interventions per flow sheet       []  Discharge due to:_  []  Other:_      Janene Washburn, PT, DPT 1/24/2023

## 2023-01-25 NOTE — PROGRESS NOTES
Physical Therapy at [facility],   a part of [address] [place] [address]  [address] [address], [address] [place] [address], [facility]   Phone: [phone]  Fax: [phone]    Discharge Summary  2-15    Patient name: Sharyle Batter  : 1995  Provider#: [id number]  Referral source: Eliceo Adair MD      Medical/Treatment Diagnosis: Other low back pain [M54.59]     Prior Hospitalization: see medical history     Comorbidities: none  Prior Level of Function: Patient completed 20 minutes of exercise once or twice a week. Medications: Verified on Patient Summary List     Start of Care: 10/20/22                                                                  Onset Date: 10 months ago          Visits from Start of Care: 8     Missed Visits: 0  Reporting Period : 10/20/22 to 23        ASSESSMENT/SUMMARY OF CARE: Mr. Gen Soni has been seen for 7 skilled physical therapy visits secondary to chronic left-sided mid-low back pain. Pt demonstrated good progress with his therapy program which utilized therapeutic exercise, manual therapy techniques, neuromuscular re-education, and modalities. Pt reports feeling 60% improved overall and notes significantly reduced pain levels. He reports improved mobility and is able to bend down to pick things up with only mild discomfort at this point. Extensive practice regarding squatting/lifting techniques to reduce strain on low back for long-term maintenance and injury prevention. He is no longer taking any pain medication. Pt does still report intermittent lower-level pain/soreness along left flank/mid-low back. Pt is discharged today and has been provided a comprehensive HEP to further progress independently. Thank you for this referral!    Short and Long Term Goals:  To be accomplished in 16 treatments:               1. Pt will be independent and compliant with HEP. - MET               2. Pt will improve FOTO score by the MCID from 63 to >/= 74 demonstrating improved overall function with decreased pain or discomfort. - Not assessed               3. Pt will be able to ambulate >/= 30 minutes without increased left flank pain. - MET               4. Pt will demonstrate correct squatting and lifting mechanics to decreased strain on back. - MET               5. Pt will be able to lift items >/= 15 lbs without increased back pain. - MET               6. Pt will be able to play soccer without increased back pain.  - Not attempted    RECOMMENDATIONS:  [x]Discontinue therapy: [x]Patient has reached or is progressing toward set goals      []Patient is non-compliant or has abdicated      [x]Due to lack of appreciable progress towards set goals    Donald Vasquez, PT, DPT 1/24/2023

## 2023-04-07 ENCOUNTER — DOCUMENTATION ONLY (OUTPATIENT)
Dept: FAMILY MEDICINE CLINIC | Facility: CLINIC | Age: 28
End: 2023-04-07

## 2023-04-07 ENCOUNTER — TELEPHONE (OUTPATIENT)
Dept: FAMILY MEDICINE CLINIC | Facility: CLINIC | Age: 28
End: 2023-04-07

## 2023-05-21 RX ORDER — DULOXETIN HYDROCHLORIDE 60 MG/1
60 CAPSULE, DELAYED RELEASE ORAL DAILY
COMMUNITY
Start: 2022-09-23